# Patient Record
Sex: MALE | HISPANIC OR LATINO | Employment: FULL TIME | URBAN - METROPOLITAN AREA
[De-identification: names, ages, dates, MRNs, and addresses within clinical notes are randomized per-mention and may not be internally consistent; named-entity substitution may affect disease eponyms.]

---

## 2022-01-20 ENCOUNTER — OFFICE VISIT (OUTPATIENT)
Dept: FAMILY MEDICINE CLINIC | Facility: CLINIC | Age: 42
End: 2022-01-20

## 2022-01-20 VITALS
BODY MASS INDEX: 30.66 KG/M2 | DIASTOLIC BLOOD PRESSURE: 64 MMHG | TEMPERATURE: 97.5 F | HEIGHT: 71 IN | WEIGHT: 219 LBS | SYSTOLIC BLOOD PRESSURE: 112 MMHG | HEART RATE: 117 BPM | OXYGEN SATURATION: 97 %

## 2022-01-20 DIAGNOSIS — E66.9 OBESITY (BMI 30-39.9): ICD-10-CM

## 2022-01-20 DIAGNOSIS — Z30.09 VASECTOMY EVALUATION: ICD-10-CM

## 2022-01-20 DIAGNOSIS — Z76.89 ENCOUNTER TO ESTABLISH CARE: Primary | ICD-10-CM

## 2022-01-20 PROCEDURE — 99212 OFFICE O/P EST SF 10 MIN: CPT | Performed by: INTERNAL MEDICINE

## 2022-01-25 NOTE — PROGRESS NOTES
Assessment/Plan:    No problem-specific Assessment & Plan notes found for this encounter  Diagnoses and all orders for this visit:    Obesity (BMI 30-39 9)  -     Comprehensive metabolic panel; Future  -     TSH, 3rd generation with Free T4 reflex; Future  -     CBC and differential; Future  -     Lipid panel; Future  -     UA (URINE) with reflex to Scope    Vasectomy evaluation  -     Ambulatory Referral to Urology; Future          Subjective:      Patient ID: Lady Alan is a 46 y o  male  Patient came today to establish care  He does not have any medical insurance  Does not have any active complaints  He was also curious about vasectomy and he would like to see the specialist for that  He does not smoke, does not drink alcohol  The following portions of the patient's history were reviewed and updated as appropriate: allergies, current medications, past family history, past medical history, past social history, past surgical history, and problem list     Review of Systems   Constitutional: Negative for chills, fatigue and fever  Respiratory: Negative for cough, chest tightness and shortness of breath  Cardiovascular: Negative for chest pain, palpitations and leg swelling  Gastrointestinal: Negative for abdominal distention, abdominal pain, blood in stool, constipation, diarrhea and nausea  Genitourinary: Negative for difficulty urinating and dysuria  Musculoskeletal: Negative for arthralgias, back pain, gait problem, joint swelling, myalgias and neck pain  Skin: Negative for rash  Neurological: Negative for dizziness, weakness, numbness and headaches  Psychiatric/Behavioral: Negative for agitation           Objective:      /64 (BP Location: Left arm, Patient Position: Sitting, Cuff Size: Large)   Pulse (!) 117   Temp 97 5 °F (36 4 °C) (Temporal)   Ht 5' 11" (1 803 m)   Wt 99 3 kg (219 lb)   SpO2 97%   BMI 30 54 kg/m²     No Known Allergies     No current outpatient medications on file  Patient Instructions   For the blood work please go to [employer]  Physical Exam  Vitals reviewed  Constitutional:       General: He is not in acute distress  Appearance: He is not toxic-appearing  HENT:      Head: Normocephalic  Cardiovascular:      Rate and Rhythm: Normal rate  Pulses: Normal pulses  Pulmonary:      Effort: Pulmonary effort is normal    Skin:     General: Skin is warm  Neurological:      General: No focal deficit present  Mental Status: He is alert     Psychiatric:         Mood and Affect: Mood normal          Behavior: Behavior normal

## 2022-02-09 ENCOUNTER — TELEPHONE (OUTPATIENT)
Dept: UROLOGY | Facility: MEDICAL CENTER | Age: 42
End: 2022-02-09

## 2022-02-09 NOTE — TELEPHONE ENCOUNTER
Patient's wife inquiring for a np for vasectomy  Patient is self pay  She will speak to  to see if he wants to go ahead with it

## 2023-01-16 NOTE — PROGRESS NOTES
INTERNAL MEDICINE OFFICE VISIT NOTE  [facility] [place]    NAME: Ulices Bran  AGE: 38 y o  SEX: male    DATE OF ENCOUNTER: 1/17/2023       Chief Complaint   Patient presents with   • Establish Care     New Patient to [facility]      Previously following with Dr Niya Donovan  Last visit on 1/2022  No complaints  Never diagnosed with medical condition nor a psychiatric condition  Not on medication  Exercises on a regular basi s and makes an attempt to have a balanced diet  Has been considering vasectomy for more than a year now and is requesting a referral to a urologist    Significant FHx: NO Colon Ca, NO Breast Ca, h/o DM, no cardiac hx  Shx: Denies current Tobacco/vaping, infrequent hookah use, Denies marijuana, Denies illict drug use  Alcohol use: 2-3 drinks during the weekend  Environmental exposures: Not known   Works as a     for 16 years - heterosexual, 2 Kids - 3 y/o M, 1 6 y/o F,    NO Available labs or imaging reports to review  Review of Systems  10 point ROS negative except per HPI    OBJECTIVE:  Vitals:    01/17/23 1411   BP: 110/70   BP Location: Left arm   Patient Position: Sitting   Cuff Size: Standard   Pulse: 67   Temp: (!) 97 4 °F (36 3 °C)   SpO2: 100%   Weight: 90 3 kg (199 lb)   Height: 5' 11" (1 803 m)       Physical Exam:   GENERAL: NAD, Normal appearance  Non diaphoretic, non-toxic, not ill-appearing, well-developed, well-nourished  NEUROLOGIC:  Alert/oriented x3  HEENT:  NC/AT, EOMI, MMM, no scleral icterus  CARDIAC:  RRR, +S1/S2, no S3/S4 heard, no m/g/r  PULMONARY:  non-labored breathing, CTA B/L, no wheezing/rales/rhonci appreciated at time of encounter  ABDOMEN:  Soft, NT/ND, +BS, no rebound/guarding/rigidity  Extremities:  2+ Pulses in DP/PT  No edema, cyanosis  SKIN:  No rashes or erythema    ASSESSMENT/PLAN:    1  Encounter to establish care    2   Sterilization consult  Assessment & Plan:  Has been considering vasectomy for more than a year now and is requesting a referral to a urologist  - Referred to urology     Orders:  -     Ambulatory Referral to Urology; Future    3  Annual physical exam  -     CBC and Platelet; Future  -     Comprehensive metabolic panel; Future  -     Lipid Panel with Direct LDL reflex; Future  -     HEMOGLOBIN A1C W/ EAG ESTIMATION; Future  -     TSH, 3rd generation with Free T4 reflex; Future    4  Need for hepatitis C screening test  -     Hepatitis C Antibody ([facility]); Future    5  Screening for HIV (human immunodeficiency virus)  -     HIV 1/2 AG/AB w Reflex [facility] for 2 yr old and above; Future    6  Overweight (BMI 25 0-29  9)  Assessment & Plan: Body mass index is 27 75 kg/m²  Wt Readings from Last 3 Encounters:   01/17/23 90 3 kg (199 lb)   01/20/22 99 3 kg (219 lb)      BMI Counseling: Body mass index is 27 75 kg/m²  The BMI is above normal  Nutrition recommendations include reducing portion sizes, decreasing overall calorie intake, 3-5 servings of fruits/vegetables daily, reducing fast food intake, consuming healthier snacks and decreasing soda and/or juice intake  Exercise recommendations include moderate aerobic physical activity for 150 minutes/week  No current outpatient medications on file          Brooklynn Anderson MD  [facility] [place]

## 2023-01-17 ENCOUNTER — OFFICE VISIT (OUTPATIENT)
Dept: INTERNAL MEDICINE CLINIC | Facility: CLINIC | Age: 43
End: 2023-01-17

## 2023-01-17 VITALS
BODY MASS INDEX: 27.86 KG/M2 | DIASTOLIC BLOOD PRESSURE: 70 MMHG | HEART RATE: 67 BPM | WEIGHT: 199 LBS | SYSTOLIC BLOOD PRESSURE: 110 MMHG | OXYGEN SATURATION: 100 % | TEMPERATURE: 97.4 F | HEIGHT: 71 IN

## 2023-01-17 DIAGNOSIS — Z11.4 SCREENING FOR HIV (HUMAN IMMUNODEFICIENCY VIRUS): ICD-10-CM

## 2023-01-17 DIAGNOSIS — Z00.00 ANNUAL PHYSICAL EXAM: ICD-10-CM

## 2023-01-17 DIAGNOSIS — E66.3 OVERWEIGHT (BMI 25.0-29.9): ICD-10-CM

## 2023-01-17 DIAGNOSIS — Z30.09 STERILIZATION CONSULT: ICD-10-CM

## 2023-01-17 DIAGNOSIS — Z76.89 ENCOUNTER TO ESTABLISH CARE: Primary | ICD-10-CM

## 2023-01-17 DIAGNOSIS — Z11.59 NEED FOR HEPATITIS C SCREENING TEST: ICD-10-CM

## 2023-01-17 NOTE — ASSESSMENT & PLAN NOTE
Has been considering vasectomy for more than a year now and is requesting a referral to a urologist  - Referred to urology

## 2023-01-17 NOTE — ASSESSMENT & PLAN NOTE
Body mass index is 27 75 kg/m²  Wt Readings from Last 3 Encounters:   01/17/23 90 3 kg (199 lb)   01/20/22 99 3 kg (219 lb)      BMI Counseling: Body mass index is 27 75 kg/m²  The BMI is above normal  Nutrition recommendations include reducing portion sizes, decreasing overall calorie intake, 3-5 servings of fruits/vegetables daily, reducing fast food intake, consuming healthier snacks and decreasing soda and/or juice intake  Exercise recommendations include moderate aerobic physical activity for 150 minutes/week

## 2023-01-21 ENCOUNTER — APPOINTMENT (OUTPATIENT)
Dept: LAB | Age: 43
End: 2023-01-21

## 2023-01-21 DIAGNOSIS — Z00.00 ANNUAL PHYSICAL EXAM: ICD-10-CM

## 2023-01-21 DIAGNOSIS — Z11.4 SCREENING FOR HIV (HUMAN IMMUNODEFICIENCY VIRUS): ICD-10-CM

## 2023-01-21 DIAGNOSIS — Z11.59 NEED FOR HEPATITIS C SCREENING TEST: ICD-10-CM

## 2023-01-21 LAB
ALBUMIN SERPL BCP-MCNC: 3.9 G/DL (ref 3.5–5)
ALP SERPL-CCNC: 43 U/L (ref 46–116)
ALT SERPL W P-5'-P-CCNC: 38 U/L (ref 12–78)
ANION GAP SERPL CALCULATED.3IONS-SCNC: 2 MMOL/L (ref 4–13)
AST SERPL W P-5'-P-CCNC: 25 U/L (ref 5–45)
BILIRUB SERPL-MCNC: 0.55 MG/DL (ref 0.2–1)
BUN SERPL-MCNC: 22 MG/DL (ref 5–25)
CALCIUM SERPL-MCNC: 9.2 MG/DL (ref 8.3–10.1)
CHLORIDE SERPL-SCNC: 108 MMOL/L (ref 96–108)
CHOLEST SERPL-MCNC: 132 MG/DL
CO2 SERPL-SCNC: 29 MMOL/L (ref 21–32)
CREAT SERPL-MCNC: 1.24 MG/DL (ref 0.6–1.3)
ERYTHROCYTE [DISTWIDTH] IN BLOOD BY AUTOMATED COUNT: 12.1 % (ref 11.6–15.1)
EST. AVERAGE GLUCOSE BLD GHB EST-MCNC: 114 MG/DL
GFR SERPL CREATININE-BSD FRML MDRD: 72 ML/MIN/1.73SQ M
GLUCOSE P FAST SERPL-MCNC: 93 MG/DL (ref 65–99)
HBA1C MFR BLD: 5.6 %
HCT VFR BLD AUTO: 44.1 % (ref 36.5–49.3)
HCV AB SER QL: NORMAL
HDLC SERPL-MCNC: 48 MG/DL
HGB BLD-MCNC: 14.3 G/DL (ref 12–17)
LDLC SERPL CALC-MCNC: 75 MG/DL (ref 0–100)
MCH RBC QN AUTO: 29.9 PG (ref 26.8–34.3)
MCHC RBC AUTO-ENTMCNC: 32.4 G/DL (ref 31.4–37.4)
MCV RBC AUTO: 92 FL (ref 82–98)
PLATELET # BLD AUTO: 298 THOUSANDS/UL (ref 149–390)
PMV BLD AUTO: 10.5 FL (ref 8.9–12.7)
POTASSIUM SERPL-SCNC: 4.5 MMOL/L (ref 3.5–5.3)
PROT SERPL-MCNC: 7.7 G/DL (ref 6.4–8.4)
RBC # BLD AUTO: 4.79 MILLION/UL (ref 3.88–5.62)
SODIUM SERPL-SCNC: 139 MMOL/L (ref 135–147)
TRIGL SERPL-MCNC: 45 MG/DL
TSH SERPL DL<=0.05 MIU/L-ACNC: 1.18 UIU/ML (ref 0.45–4.5)
WBC # BLD AUTO: 4.01 THOUSAND/UL (ref 4.31–10.16)

## 2023-01-23 ENCOUNTER — TELEPHONE (OUTPATIENT)
Dept: INTERNAL MEDICINE CLINIC | Facility: CLINIC | Age: 43
End: 2023-01-23

## 2023-01-23 LAB
HIV 1+2 AB+HIV1 P24 AG SERPL QL IA: NORMAL
HIV 2 AB SERPL QL IA: NORMAL
HIV1 AB SERPL QL IA: NORMAL
HIV1 P24 AG SERPL QL IA: NORMAL

## 2023-01-23 NOTE — TELEPHONE ENCOUNTER
Called patient gave results and understood     ----- Message from Phoenix Redding MD sent at 1/22/2023 11:28 AM EST -----  Please call the patient regarding his most recent labs  No remarkable findings  We'll see him in 6 months as planned

## 2023-02-03 ENCOUNTER — TELEPHONE (OUTPATIENT)
Dept: UROLOGY | Facility: AMBULATORY SURGERY CENTER | Age: 43
End: 2023-02-03

## 2023-02-03 NOTE — TELEPHONE ENCOUNTER
Please Triage  New Patient    What is the reason for the patient’s appointment? Sterilization consult    What office location does the patient prefer? [place]     Imaging/Lab Results:    Do we accept the patient's insurance or is the patient Self-Pay? Yes     Insurance Provider: Al 4 Type/Number:  Member ID#: Has the patient had any previous Urologist(s)? No     Have patient records been requested? If not are records showing in [address] [place] [address] Rd: records in [facility]     Has the patient had any outside testing done? No     Does the patient have a personal history of cancer?  No

## 2023-03-06 ENCOUNTER — OFFICE VISIT (OUTPATIENT)
Dept: UROLOGY | Facility: AMBULATORY SURGERY CENTER | Age: 43
End: 2023-03-06

## 2023-03-06 VITALS
HEIGHT: 71 IN | HEART RATE: 55 BPM | DIASTOLIC BLOOD PRESSURE: 62 MMHG | SYSTOLIC BLOOD PRESSURE: 114 MMHG | OXYGEN SATURATION: 100 % | BODY MASS INDEX: 27.35 KG/M2 | WEIGHT: 195.4 LBS

## 2023-03-06 DIAGNOSIS — Z30.09 STERILIZATION CONSULT: ICD-10-CM

## 2023-03-06 DIAGNOSIS — Z98.52 STATUS POST VASECTOMY: Primary | ICD-10-CM

## 2023-03-06 RX ORDER — LORAZEPAM 1 MG/1
1 TABLET ORAL ONCE
Qty: 1 TABLET | Refills: 0 | Status: SHIPPED | OUTPATIENT
Start: 2023-03-06 | End: 2023-03-06

## 2023-03-06 NOTE — PROGRESS NOTES
3/6/2023      Chief Complaint   Patient presents with   • Sterilization     consult     Assessment and Plan    38 y o  male managed by new patient    1  Encounter for sterilization  · Prescription for lorazepam sent to pharmacy  · Procedure consent signed  · Semen analysis ordered to be performed at 3 months post procedure  · Follow-up in the office as scheduled for vasectomy procedure with physician    We discussed that vasectomy is in operation performed in the office in order to provide elective sterilization  This procedure should be considered a permanent option  Although there are subspecialists who perform vasectomy reversals, these operations are not 100% successful and are often not covered by insurance meaning they can come with a large out-of-pocket cost  The patient understands this  We reviewed the procedure in depth  Risk and benefits of the procedure were discussed and reviewed  Informed consent was obtained in the office today  The patient was prescribed a benzodiazepine to take one hour prior to the procedure to assist with his comfort  He understands that he will require transportation to and from the office that day if he is to use the benzodiazepine  He also understands he will require  semen analysis testing at 8 weeks post procedure to ensure full sterilization  In the interim, he will require contraception during intercourse to avoid an undesired pregnancy  Usually, patients are out of work for 2-3 days  We recommend tight fitting scrotal support following the procedure along with ice packs applied to the scrotum 15 minutes on and 15 minutes off for the first 24 hours  We discussed that we do send the patient home with short course of anti-inflammatory and/or narcotic pain medication  After this discussion, the patient agrees to proceed  We will schedule him in the near future  He agrees to oral sedative prior to procedure      History of Present Illness  [facility] 38 [facility] o  male here for discussion for vasectomy  Patient denies a history of genitourinary or groin trauma/surgery  He reports fathering 2 children ages 3 and 3-3/1years of age  He currently reports being in a relationship where both partners are desiring permanent sterility  They are currently using birth control pills as a form of contraception  Patient works as a tractor-  Currently denies all lower urinary tract symptoms including dysuria, hematuria, urinary frequency and urgency  Reports sensation of complete emptying with urination  He denies use of blood thinners and NSAIDs  Denies an allergy to Marcaine/lidocaine/Betadine/chromic  He denies previous history of erectile dysfunction  The patient presents requesting elective sterilization vasectomy  Review of Systems   Constitutional: Negative for chills and fever  Respiratory: Negative for cough and shortness of breath  Cardiovascular: Negative for chest pain  Gastrointestinal: Negative for abdominal distention, abdominal pain, blood in stool, nausea and vomiting  Genitourinary: Negative for difficulty urinating, dysuria, enuresis, flank pain, frequency, hematuria and urgency  Skin: Negative for rash  Past Medical History  History reviewed  No pertinent past medical history  Past Social History  History reviewed  No pertinent surgical history    Social History     Tobacco Use   Smoking Status Never   Smokeless Tobacco Never       Past Family History  Family History   Problem Relation Age of Onset   • Diabetes Mother        Past Social history  Social History     Socioeconomic History   • Marital status: /Civil Union     Spouse name: Not on file   • Number of children: Not on file   • Years of education: Not on file   • Highest education level: Not on file   Occupational History     Comment: [employer] as a    Tobacco Use   • Smoking status: Never   • Smokeless tobacco: Never   Vaping Use   • Vaping Use: Former   Substance and Sexual Activity   • Alcohol use: Yes     Comment: Weekend/social   • Drug use: Never   • Sexual activity: Yes     Partners: Female   Other Topics Concern   • Not on file   Social History Narrative    1/2023: Works as a       for 16 years - heterosexual, 2 Kids - 3 y/o M, 1 4 y/o F,     Social Determinants of Health     Financial Resource Strain: Not on [employer] Insecurity: Not on file   Transportation Needs: Not on file   Physical Activity: Not on file   Stress: Not on file   Social Connections: Not on file   Intimate Partner Violence: Not on file   Housing Stability: Not on file       Current Medications  Current Outpatient Medications   Medication Sig Dispense Refill   • LORazepam (ATIVAN) 1 mg tablet Take 1 tablet (1 mg total) by mouth 1 (one) time for 1 dose 1 tablet 0     No current facility-administered medications for this visit  Allergies  No Known Allergies      The following portions of the patient's history were reviewed and updated as appropriate: allergies, current medications, past medical history, past social history, past surgical history and problem list       Vitals  Vitals:    03/06/23 1440   BP: 114/62   BP Location: Right arm   Patient Position: Sitting   Cuff Size: Adult   Pulse: 55   SpO2: 100%   Weight: 88 6 kg (195 lb 6 4 oz)   Height: 5' 11" (1 803 m)           Physical Exam  Physical Exam  Vitals reviewed  Constitutional:       General: He is not in acute distress  Appearance: Normal appearance  He is normal weight  HENT:      Head: Normocephalic  Cardiovascular:      Rate and Rhythm: Normal rate  Pulmonary:      Effort: No respiratory distress  Breath sounds: Normal breath sounds  Abdominal:      Hernia: There is no hernia in the left inguinal area or right inguinal area  Genitourinary:     Penis: Normal and uncircumcised   No phimosis, paraphimosis, hypospadias, erythema, tenderness, discharge, swelling or lesions  Testes: Normal          Right: Mass, tenderness, swelling or testicular hydrocele not present  Left: Mass, tenderness, swelling or testicular hydrocele not present  Epididymis:      Right: Normal       Left: Normal    Lymphadenopathy:      Lower Body: No right inguinal adenopathy  No left inguinal adenopathy  Skin:     General: Skin is warm and dry  Neurological:      General: No focal deficit present  Mental Status: He is alert and oriented to person, place, and time  Psychiatric:         Mood and Affect: Mood normal          Behavior: Behavior normal        Results  No results found for this or any previous visit (from the past 1 hour(s))  ]  No results found for: PSA  Lab Results   Component Value Date    CALCIUM 9 2 01/21/2023    K 4 5 01/21/2023    CO2 29 01/21/2023     01/21/2023    BUN 22 01/21/2023    CREATININE 1 24 01/21/2023     Lab Results   Component Value Date    WBC 4 01 (L) 01/21/2023    HGB 14 3 01/21/2023    HCT 44 1 01/21/2023    MCV 92 01/21/2023     01/21/2023       Orders  Orders Placed This Encounter   Procedures   • Semen analysis, post-vasectomy     This is a patient instruction: Please call Central Scheduling [phone] to make an appointment for testing  Please refer to patient instructions on the Post Vasectomy Form provided to you by your doctor  If you have additional questions on collection of your specimen, please call the Lab Call Center at [phone]           Standing Status:   Future     Standing Expiration Date:   3/6/2024       ELISEO Magana

## 2023-05-19 ENCOUNTER — PROCEDURE VISIT (OUTPATIENT)
Dept: UROLOGY | Facility: AMBULATORY SURGERY CENTER | Age: 43
End: 2023-05-19

## 2023-05-19 VITALS
BODY MASS INDEX: 26.78 KG/M2 | SYSTOLIC BLOOD PRESSURE: 120 MMHG | DIASTOLIC BLOOD PRESSURE: 80 MMHG | HEART RATE: 92 BPM | OXYGEN SATURATION: 98 % | WEIGHT: 192 LBS

## 2023-05-19 DIAGNOSIS — Z98.52 STATUS POST VASECTOMY: ICD-10-CM

## 2023-05-19 DIAGNOSIS — Z30.09 STERILIZATION CONSULT: Primary | ICD-10-CM

## 2023-05-24 PROCEDURE — 88302 TISSUE EXAM BY PATHOLOGIST: CPT | Performed by: PATHOLOGY

## 2023-08-10 ENCOUNTER — APPOINTMENT (OUTPATIENT)
Dept: LAB | Facility: HOSPITAL | Age: 43
End: 2023-08-10
Attending: UROLOGY
Payer: COMMERCIAL

## 2023-08-10 DIAGNOSIS — Z98.52 STATUS POST VASECTOMY: ICD-10-CM

## 2023-08-10 LAB
DEPRECATED CD4 CELLS/CD8 CELLS BLD: 3 ML
SPERM MOTILE SMN QL MICRO: NORMAL

## 2023-08-10 PROCEDURE — 89321 SEMEN ANAL SPERM DETECTION: CPT

## 2023-08-11 ENCOUNTER — TELEPHONE (OUTPATIENT)
Dept: UROLOGY | Facility: AMBULATORY SURGERY CENTER | Age: 43
End: 2023-08-11

## 2023-08-11 NOTE — TELEPHONE ENCOUNTER
Contacted patient and made him aware of negative semen analysis results and that he is considered sterile.

## 2024-04-03 ENCOUNTER — OFFICE VISIT (OUTPATIENT)
Dept: INTERNAL MEDICINE CLINIC | Facility: CLINIC | Age: 44
End: 2024-04-03
Payer: COMMERCIAL

## 2024-04-03 VITALS
WEIGHT: 202 LBS | BODY MASS INDEX: 28.28 KG/M2 | DIASTOLIC BLOOD PRESSURE: 68 MMHG | RESPIRATION RATE: 18 BRPM | TEMPERATURE: 97.3 F | SYSTOLIC BLOOD PRESSURE: 120 MMHG | OXYGEN SATURATION: 98 % | HEIGHT: 71 IN | HEART RATE: 95 BPM

## 2024-04-03 DIAGNOSIS — Z00.00 ANNUAL PHYSICAL EXAM: Primary | ICD-10-CM

## 2024-04-03 DIAGNOSIS — M25.511 ACUTE PAIN OF RIGHT SHOULDER: ICD-10-CM

## 2024-04-03 DIAGNOSIS — R35.0 URINARY FREQUENCY: ICD-10-CM

## 2024-04-03 PROCEDURE — 99396 PREV VISIT EST AGE 40-64: CPT | Performed by: STUDENT IN AN ORGANIZED HEALTH CARE EDUCATION/TRAINING PROGRAM

## 2024-04-03 PROCEDURE — 99214 OFFICE O/P EST MOD 30 MIN: CPT | Performed by: STUDENT IN AN ORGANIZED HEALTH CARE EDUCATION/TRAINING PROGRAM

## 2024-04-03 RX ORDER — MELOXICAM 15 MG/1
TABLET ORAL
Qty: 30 TABLET | Refills: 0 | Status: SHIPPED | OUTPATIENT
Start: 2024-04-03

## 2024-04-03 RX ORDER — METHOCARBAMOL 750 MG/1
TABLET, FILM COATED ORAL
Qty: 30 TABLET | Refills: 0 | Status: SHIPPED | OUTPATIENT
Start: 2024-04-03

## 2024-04-03 NOTE — ASSESSMENT & PLAN NOTE
Complains of slowly progressing right shoulder pain particularly when lifting heavy objects or lifting his kids over is shoulders.  -Unable to recall an exacerbating event  -Mild crepitus on exam with normal range of motion.  -Will proceed with a 2-week trial of meloxicam and methocarbamol.  May extend up to 4 weeks  -Advised to contact our office if pain persist beyond 4 weeks

## 2024-04-03 NOTE — ASSESSMENT & PLAN NOTE
Complains of urinary frequency without urgency or dysuria  Reports stable stream. No agitation.  -Proceed with a urinalysis

## 2024-04-03 NOTE — PROGRESS NOTES
"INTERNAL MEDICINE OFFICE VISIT NOTE  [facility] [place]    NAME: Garcia Rivera  AGE: 43 y.o. SEX: male    DATE OF ENCOUNTER: 4/3/2024       Chief Complaint   Patient presents with    Follow-up     Pt refused tdap     Patient presents for a comprehensive physical exam      Diet/Nutrition: well balanced diet.   Exercise:  Exercises regularly however not in the last month .   Sleep: sleeps well.   Hearing: normal - bilateral.  Vision: no vision problems.   Dental: no dental visits for >1 year.        Health  Symptoms include: none    /GYN Health    Depression Screening  PHQ-2/9 Depression Screening    Little interest or pleasure in doing things: 0 - not at all  Feeling down, depressed, or hopeless: 0 - not at all  PHQ-2 Score: 0  PHQ-2 Interpretation: Negative depression screen         Labs reviewed.       Review of Systems  10 point ROS negative except per HPI    OBJECTIVE:  Vitals:    04/03/24 1132   BP: 120/68   BP Location: Left arm   Patient Position: Sitting   Cuff Size: Standard   Pulse: 95   Resp: 18   Temp: (!) 97.3 °F (36.3 °C)   SpO2: 98%   Weight: 91.6 kg (202 lb)   Height: 5' 11\" (1.803 m)       Physical Exam:   GENERAL: NAD, Normal appearance. Non diaphoretic, non-toxic, well-developed, well-nourished.   NEUROLOGIC:  Alert/oriented x3  HEENT:  NC/AT, PERRL, EOMI, no scleral icterus  CARDIAC:  RRR, +S1/S2, no m/g/r  PULMONARY:  non-labored breathing, CTA B/L  ABDOMEN:  Soft, NT/ND, +BS  Extremities:  No edema  SKIN:  No rashes or erythema     ASSESSMENT/PLAN:    1. Annual physical exam  -     CBC; Future  -     Comprehensive metabolic panel; Future  -     TSH, 3rd generation with Free T4 reflex; Future  -     Hemoglobin A1C; Future  -     Lipid Panel with Direct LDL reflex; Future    2. Acute pain of right shoulder  Assessment & Plan:  Complains of slowly progressing right shoulder pain particularly when lifting heavy objects or lifting his kids over is shoulders.  -Unable to " recall an exacerbating event  -Mild crepitus on exam with normal range of motion.  -Will proceed with a 2-week trial of meloxicam and methocarbamol.  May extend up to 4 weeks  -Advised to contact our office if pain persist beyond 4 weeks    Orders:  -     meloxicam (MOBIC) 15 mg tablet; Take 1 tablet in the morning after breakfast - DO NOT REFILL  -     methocarbamol (Robaxin-750) 750 mg tablet; Take 1 tablet 1 hour before bedtime - allow 7-8 hrs of sleep that night.    3. Urinary frequency  Assessment & Plan:  Complains of urinary frequency without urgency or dysuria  Reports stable stream. No agitation.  -Proceed with a urinalysis    Orders:  -     UA w Reflex to Microscopic w Reflex to Culture; Future        Return in 6 months (on 10/3/2024).      Current Outpatient Medications:     meloxicam (MOBIC) 15 mg tablet, Take 1 tablet in the morning after breakfast - DO NOT REFILL, Disp: 30 tablet, Rfl: 0    methocarbamol (Robaxin-750) 750 mg tablet, Take 1 tablet 1 hour before bedtime - allow 7-8 hrs of sleep that night., Disp: 30 tablet, Rfl: 0    LORazepam (ATIVAN) 1 mg tablet, Take 1 tablet (1 mg total) by mouth 1 (one) time for 1 dose (Patient not taking: Reported on 4/3/2024), Disp: 1 tablet, Rfl: 0    TCM Call       None          TCM Call       None               Matt Wing MD  [facility] [place]    * Please Note: This note was completed in part utilizing a dictation software.  Grammatical errors, random word insertions, spelling mistakes, and incomplete sentences may be an occasional consequence of this system secondary to software limitations, ambient noise, and hardware issues.  If you have any questions or concerns about the content, text, or information contained within the body of this dictation, please contact the provider for clarification.**

## 2024-04-13 ENCOUNTER — APPOINTMENT (OUTPATIENT)
Dept: LAB | Age: 44
End: 2024-04-13
Payer: COMMERCIAL

## 2024-04-13 DIAGNOSIS — R35.0 URINARY FREQUENCY: ICD-10-CM

## 2024-04-13 DIAGNOSIS — Z00.00 ANNUAL PHYSICAL EXAM: ICD-10-CM

## 2024-04-13 LAB
BACTERIA UR QL AUTO: NORMAL /HPF
BILIRUB UR QL STRIP: NEGATIVE
CLARITY UR: ABNORMAL
COLOR UR: ABNORMAL
ERYTHROCYTE [DISTWIDTH] IN BLOOD BY AUTOMATED COUNT: 12 % (ref 11.6–15.1)
GLUCOSE UR STRIP-MCNC: NEGATIVE MG/DL
HCT VFR BLD AUTO: 44.5 % (ref 36.5–49.3)
HGB BLD-MCNC: 14.3 G/DL (ref 12–17)
HGB UR QL STRIP.AUTO: NEGATIVE
KETONES UR STRIP-MCNC: NEGATIVE MG/DL
LEUKOCYTE ESTERASE UR QL STRIP: NEGATIVE
MCH RBC QN AUTO: 29.5 PG (ref 26.8–34.3)
MCHC RBC AUTO-ENTMCNC: 32.1 G/DL (ref 31.4–37.4)
MCV RBC AUTO: 92 FL (ref 82–98)
NITRITE UR QL STRIP: NEGATIVE
NON-SQ EPI CELLS URNS QL MICRO: NORMAL /HPF
PH UR STRIP.AUTO: 6 [PH]
PLATELET # BLD AUTO: 286 THOUSANDS/UL (ref 149–390)
PMV BLD AUTO: 10.3 FL (ref 8.9–12.7)
PROT UR STRIP-MCNC: ABNORMAL MG/DL
RBC # BLD AUTO: 4.85 MILLION/UL (ref 3.88–5.62)
RBC #/AREA URNS AUTO: NORMAL /HPF
SP GR UR STRIP.AUTO: 1.03 (ref 1–1.03)
TSH SERPL DL<=0.05 MIU/L-ACNC: 1.55 UIU/ML (ref 0.45–4.5)
UROBILINOGEN UR STRIP-ACNC: <2 MG/DL
WBC # BLD AUTO: 3.76 THOUSAND/UL (ref 4.31–10.16)
WBC #/AREA URNS AUTO: NORMAL /HPF

## 2024-04-13 PROCEDURE — 36415 COLL VENOUS BLD VENIPUNCTURE: CPT

## 2024-04-13 PROCEDURE — 85027 COMPLETE CBC AUTOMATED: CPT

## 2024-04-13 PROCEDURE — 81001 URINALYSIS AUTO W/SCOPE: CPT

## 2024-04-13 PROCEDURE — 80053 COMPREHEN METABOLIC PANEL: CPT

## 2024-04-13 PROCEDURE — 84443 ASSAY THYROID STIM HORMONE: CPT

## 2024-04-13 PROCEDURE — 83036 HEMOGLOBIN GLYCOSYLATED A1C: CPT

## 2024-04-13 PROCEDURE — 80061 LIPID PANEL: CPT

## 2024-04-14 LAB
ALBUMIN SERPL BCP-MCNC: 4.1 G/DL (ref 3.5–5)
ALP SERPL-CCNC: 35 U/L (ref 34–104)
ALT SERPL W P-5'-P-CCNC: 17 U/L (ref 7–52)
ANION GAP SERPL CALCULATED.3IONS-SCNC: 9 MMOL/L (ref 4–13)
AST SERPL W P-5'-P-CCNC: 17 U/L (ref 13–39)
BILIRUB SERPL-MCNC: 0.58 MG/DL (ref 0.2–1)
BUN SERPL-MCNC: 19 MG/DL (ref 5–25)
CALCIUM SERPL-MCNC: 9 MG/DL (ref 8.4–10.2)
CHLORIDE SERPL-SCNC: 102 MMOL/L (ref 96–108)
CHOLEST SERPL-MCNC: 152 MG/DL
CO2 SERPL-SCNC: 27 MMOL/L (ref 21–32)
CREAT SERPL-MCNC: 0.93 MG/DL (ref 0.6–1.3)
EST. AVERAGE GLUCOSE BLD GHB EST-MCNC: 123 MG/DL
GFR SERPL CREATININE-BSD FRML MDRD: 101 ML/MIN/1.73SQ M
GLUCOSE P FAST SERPL-MCNC: 81 MG/DL (ref 65–99)
HBA1C MFR BLD: 5.9 %
HDLC SERPL-MCNC: 48 MG/DL
LDLC SERPL CALC-MCNC: 90 MG/DL (ref 0–100)
POTASSIUM SERPL-SCNC: 4.6 MMOL/L (ref 3.5–5.3)
PROT SERPL-MCNC: 6.8 G/DL (ref 6.4–8.4)
SODIUM SERPL-SCNC: 138 MMOL/L (ref 135–147)
TRIGL SERPL-MCNC: 72 MG/DL

## 2024-04-15 DIAGNOSIS — R80.9 PROTEINURIA, UNSPECIFIED TYPE: Primary | ICD-10-CM

## 2024-04-24 ENCOUNTER — APPOINTMENT (OUTPATIENT)
Dept: LAB | Age: 44
End: 2024-04-24
Payer: COMMERCIAL

## 2024-04-24 DIAGNOSIS — R80.9 PROTEINURIA, UNSPECIFIED TYPE: ICD-10-CM

## 2024-04-24 LAB
CREAT UR-MCNC: 60.3 MG/DL
PROT UR-MCNC: <4 MG/DL

## 2024-04-24 PROCEDURE — 82570 ASSAY OF URINE CREATININE: CPT

## 2024-04-24 PROCEDURE — 84156 ASSAY OF PROTEIN URINE: CPT

## 2024-09-03 ENCOUNTER — OFFICE VISIT (OUTPATIENT)
Dept: INTERNAL MEDICINE CLINIC | Facility: CLINIC | Age: 44
End: 2024-09-03

## 2024-09-03 VITALS
WEIGHT: 192 LBS | HEART RATE: 88 BPM | BODY MASS INDEX: 26.88 KG/M2 | DIASTOLIC BLOOD PRESSURE: 74 MMHG | TEMPERATURE: 98 F | SYSTOLIC BLOOD PRESSURE: 125 MMHG | HEIGHT: 71 IN

## 2024-09-03 DIAGNOSIS — G56.03 BILATERAL CARPAL TUNNEL SYNDROME: Primary | ICD-10-CM

## 2024-09-03 PROCEDURE — 99213 OFFICE O/P EST LOW 20 MIN: CPT | Performed by: STUDENT IN AN ORGANIZED HEALTH CARE EDUCATION/TRAINING PROGRAM

## 2024-09-03 RX ORDER — MELOXICAM 15 MG/1
TABLET ORAL
Qty: 30 TABLET | Refills: 0 | Status: SHIPPED | OUTPATIENT
Start: 2024-09-03 | End: 2024-09-03

## 2024-09-03 RX ORDER — MELOXICAM 15 MG/1
TABLET ORAL
Qty: 30 TABLET | Refills: 0 | Status: SHIPPED | OUTPATIENT
Start: 2024-09-03

## 2024-09-03 NOTE — PROGRESS NOTES
"INTERNAL MEDICINE OFFICE VISIT NOTE  [facility] [place]    NAME: Garcia Rivera  AGE: 44 y.o. SEX: male    DATE OF ENCOUNTER:       Chief Complaint   Patient presents with    Follow-up     Left Hand Pain/Numbness/Tingling           Review of Systems  10 point ROS negative except per HPI    OBJECTIVE:  Vitals:    09/03/24 1114   BP: 125/74   BP Location: Left arm   Patient Position: Standing   Cuff Size: Standard   Pulse: 88   Temp: 98 °F (36.7 °C)   Weight: 87.1 kg (192 lb)   Height: 5' 11\" (1.803 m)       Physical Exam:   GENERAL: NAD, Normal appearance.    NEUROLOGIC:  Alert/oriented x3.  HEENT:  NC/AT, no scleral icterus  CARDIAC:  RRR, +S1/S2  PULMONARY:  non-labored breathing  MSK as below    ASSESSMENT/PLAN:    1. Bilateral carpal tunnel syndrome  Assessment & Plan:  Presents with complaint of bilateral hand pain, left more than right.  Reports he has been progressively getting worse in the last 2 to 3 months.  Described as numbing tingling sensation and second third and fourth digit.  Worse at nighttime or whenever he is driving, he is a .  Positive Tejal and finals test on the left.  Negative on the right.  Advised OTC wrist support bilaterally to be worn at bedtime, however he may also use it during the day  Start meloxicam for 30 days and OTC Voltaren gel  Follow-up as needed  Orders:  -     meloxicam (MOBIC) 15 mg tablet; Take 1 tablet in the morning after breakfast - DO NOT REFILL      No follow-ups on file.      Current Outpatient Medications:     LORazepam (ATIVAN) 1 mg tablet, Take 1 tablet (1 mg total) by mouth 1 (one) time for 1 dose (Patient not taking: Reported on 9/3/2024), Disp: 1 tablet, Rfl: 0    meloxicam (MOBIC) 15 mg tablet, Take 1 tablet in the morning after breakfast - DO NOT REFILL (Patient not taking: Reported on 9/3/2024), Disp: 30 tablet, Rfl: 0    methocarbamol (Robaxin-750) 750 mg tablet, Take 1 tablet 1 hour before bedtime - allow 7-8 hrs of " sleep that night. (Patient not taking: Reported on 9/3/2024), Disp: 30 tablet, Rfl: 0    Patient Active Problem List   Diagnosis    Sterilization consult    Overweight (BMI 25.0-29.9)    Acute pain of right shoulder    Urinary frequency           Matt Wing MD  [facility] [place]    * Please Note: This note was completed in part utilizing a dictation software.  Grammatical errors, random word insertions, spelling mistakes, and incomplete sentences may be an occasional consequence of this system secondary to software limitations, ambient noise, and hardware issues.  If you have any questions or concerns about the content, text, or information contained within the body of this dictation, please contact the provider for clarification.**

## 2024-09-03 NOTE — ASSESSMENT & PLAN NOTE
Presents with complaint of bilateral hand pain, left more than right.  Reports he has been progressively getting worse in the last 2 to 3 months.  Described as numbing tingling sensation and second third and fourth digit.  Worse at nighttime or whenever he is driving, he is a .  Positive Tejal and finals test on the left.  Negative on the right.  Advised OTC wrist support bilaterally to be worn at bedtime, however he may also use it during the day  Start meloxicam for 30 days and OTC Voltaren gel  Follow-up as needed

## 2024-10-07 ENCOUNTER — TELEPHONE (OUTPATIENT)
Age: 44
End: 2024-10-07

## 2024-10-07 DIAGNOSIS — G56.03 BILATERAL CARPAL TUNNEL SYNDROME: Primary | ICD-10-CM

## 2024-10-07 NOTE — TELEPHONE ENCOUNTER
Patient called the office requesting for a referral to see orthopedic due to his carpal tunnel. Please review and advise, pt would like a call back once this is placed.

## 2024-10-14 ENCOUNTER — OFFICE VISIT (OUTPATIENT)
Dept: OBGYN CLINIC | Facility: CLINIC | Age: 44
End: 2024-10-14
Payer: COMMERCIAL

## 2024-10-14 VITALS
DIASTOLIC BLOOD PRESSURE: 70 MMHG | SYSTOLIC BLOOD PRESSURE: 114 MMHG | HEIGHT: 71 IN | WEIGHT: 192 LBS | BODY MASS INDEX: 26.88 KG/M2

## 2024-10-14 DIAGNOSIS — R20.0 BILATERAL HAND NUMBNESS: ICD-10-CM

## 2024-10-14 DIAGNOSIS — M79.642 BILATERAL HAND PAIN: Primary | ICD-10-CM

## 2024-10-14 DIAGNOSIS — M79.641 BILATERAL HAND PAIN: Primary | ICD-10-CM

## 2024-10-14 PROCEDURE — 99203 OFFICE O/P NEW LOW 30 MIN: CPT | Performed by: SURGERY

## 2024-10-14 RX ORDER — IBUPROFEN 400 MG/1
TABLET, FILM COATED ORAL EVERY 6 HOURS PRN
COMMUNITY

## 2024-10-14 NOTE — PROGRESS NOTES
ORTHOPAEDIC HAND, WRIST, AND ELBOW OFFICE  VISIT       ASSESSMENT/PLAN:      44 y.o. year old male who presents with Bilateral hand numbness    Physical exam was performed and we discussed the findings  Recommend restart night time only splinting  MSK US ordered for bilateral Carpal and Cubital tunnels      The patient verbalized understanding of exam findings and treatment plan. We engaged in the shared decision-making process and treatment options were discussed at length with the patient. Surgical and conservative management discussed today along with risks and benefits.    Diagnoses and all orders for this visit:    Bilateral hand pain  -     US MSK limited; Future  -     US MSK limited; Future    Bilateral hand numbness  -     Ambulatory Referral to Orthopedic Surgery    Other orders  -     ibuprofen (MOTRIN) 400 mg tablet; Take by mouth every 6 (six) hours as needed for mild pain        Follow Up:  Return for After Ultrasound.    To Do Next Visit:  Re-evaluation of current issue        ____________________________________________________________________________________________________________________________________________      CHIEF COMPLAINT:  Chief Complaint   Patient presents with    Left Wrist - Pain, Numbness    Right Wrist - Pain, Numbness       SUBJECTIVE:  Garcia Rivera is a 44 y.o. year old  male who presents for evaluation     Patient states he has had bilateral hand numbness and tingling that started about  two months ago. He went and was seen by his PCP who ordered bracing. PCP note reviewed  Patient states he only braced on the weekends and gave up at night due to discomfort and at times increased hand pain.   Left hand is worse in digits 1-3 with some on symptoms in the ring. Numbness is constant  No other treatments    I have personally reviewed all the relevant PMH, PSH, SH, FH, Medications and allergies      PAST MEDICAL HISTORY:  No past medical history on file.    PAST SURGICAL  HISTORY:  Past Surgical History:   Procedure Laterality Date    VASECTOMY  05/19/2023    Dr. Filippo eMeks       FAMILY HISTORY:  Family History   Problem Relation Age of Onset    Diabetes Mother        SOCIAL HISTORY:  Social History     Tobacco Use    Smoking status: Never    Smokeless tobacco: Never   Vaping Use    Vaping status: Former   Substance Use Topics    Alcohol use: Yes     Comment: Weekend/social    Drug use: Never       MEDICATIONS:    Current Outpatient Medications:     ibuprofen (MOTRIN) 400 mg tablet, Take by mouth every 6 (six) hours as needed for mild pain, Disp: , Rfl:     LORazepam (ATIVAN) 1 mg tablet, Take 1 tablet (1 mg total) by mouth 1 (one) time for 1 dose (Patient not taking: Reported on 9/3/2024), Disp: 1 tablet, Rfl: 0    meloxicam (MOBIC) 15 mg tablet, Take 1 tablet in the morning after breakfast - DO NOT REFILL (Patient not taking: Reported on 10/14/2024), Disp: 30 tablet, Rfl: 0    methocarbamol (Robaxin-750) 750 mg tablet, Take 1 tablet 1 hour before bedtime - allow 7-8 hrs of sleep that night. (Patient not taking: Reported on 9/3/2024), Disp: 30 tablet, Rfl: 0    ALLERGIES:  No Known Allergies        REVIEW OF SYSTEMS:  Review of Systems   Constitutional:  Negative for chills and fever.   HENT:  Negative for ear pain and sore throat.    Eyes:  Negative for pain and visual disturbance.   Respiratory:  Negative for cough and shortness of breath.    Cardiovascular:  Negative for chest pain and palpitations.   Gastrointestinal:  Negative for abdominal pain and vomiting.   Genitourinary:  Negative for dysuria and hematuria.   Musculoskeletal:  Negative for arthralgias and back pain.   Skin:  Negative for color change and rash.   Neurological:  Positive for numbness. Negative for seizures and syncope.   All other systems reviewed and are negative.      VITALS:  Vitals:    10/14/24 1709   BP: 114/70       LABS:  HgA1c:   Lab Results   Component Value Date    HGBA1C 5.9 (H) 04/13/2024      BMP:   Lab Results   Component Value Date    CALCIUM 9.0 04/13/2024    K 4.6 04/13/2024    CO2 27 04/13/2024     04/13/2024    BUN 19 04/13/2024    CREATININE 0.93 04/13/2024       _____________________________________________________  PHYSICAL EXAMINATION:  General: well developed and well nourished, alert, oriented times 3, and appears comfortable  Psychiatric: Normal  HEENT: Normocephalic, Atraumatic Trachea Midline, No torticollis  Pulmonary: No audible wheezing or respiratory distress   Abdomen/GI: Non tender, non distended   Cardiovascular: No pitting edema, 2+ radial pulse   Skin: No masses, erythema, lacerations, fluctation, ulcerations  Neurovascular: Sensation Intact to the Median, Ulnar, Radial Nerve, Motor Intact to the Median, Ulnar, Radial Nerve, and Pulses Intact  Musculoskeletal: Normal, except as noted in detailed exam and in HPI.      MUSCULOSKELETAL EXAMINATION:  Right hand:  SILT  Composite fist      Negative Durkan's  Negative elbow flexion compression test    2 point discrimination:   Thumb 5 mm  Index 5 mm  Middle 5 mm  Ring 5 mm  Small 5 mm    Left hand:  SILT  Composite fist    Negative Durkan's  Negative elbow flexion compression test    2 point discrimination:  5mm  Thumb 5 mm  Index 5 mm  Middle 5 mm  Ring 5 mm  Small 5 mm    ___________________________________________________  STUDIES REVIEWED:  No images reviewed    PROCEDURES PERFORMED:  Procedures  No Procedures performed today    _____________________________________________________      Scribe Attestation      I,:  Gabino Wilson am acting as a scribe while in the presence of the attending physician.:       I,:  Jeffrey Costello MD personally performed the services described in this documentation    as scribed in my presence.:

## 2024-10-29 ENCOUNTER — HOSPITAL ENCOUNTER (OUTPATIENT)
Dept: ULTRASOUND IMAGING | Facility: HOSPITAL | Age: 44
Discharge: HOME | End: 2024-10-29
Attending: SURGERY
Payer: COMMERCIAL

## 2024-10-29 DIAGNOSIS — M79.642 BILATERAL HAND PAIN: ICD-10-CM

## 2024-10-29 DIAGNOSIS — M79.641 BILATERAL HAND PAIN: ICD-10-CM

## 2024-10-29 PROCEDURE — 76882 US LMTD JT/FCL EVL NVASC XTR: CPT

## 2024-11-19 ENCOUNTER — OFFICE VISIT (OUTPATIENT)
Dept: OBGYN CLINIC | Facility: CLINIC | Age: 44
End: 2024-11-19
Payer: COMMERCIAL

## 2024-11-19 VITALS
WEIGHT: 192 LBS | BODY MASS INDEX: 26.88 KG/M2 | HEIGHT: 71 IN | DIASTOLIC BLOOD PRESSURE: 60 MMHG | SYSTOLIC BLOOD PRESSURE: 120 MMHG

## 2024-11-19 DIAGNOSIS — G56.03 CARPAL TUNNEL SYNDROME, BILATERAL: Primary | ICD-10-CM

## 2024-11-19 PROCEDURE — 99214 OFFICE O/P EST MOD 30 MIN: CPT | Performed by: SURGERY

## 2024-11-19 NOTE — LETTER
November 19, 2024     Patient: Garcia Rivera  YOB: 1980  Date of Visit: 11/19/2024      To Whom it May Concern:    Garcia Rivera is under my professional care. Garcia was seen in my office on 11/19/2024. Garcia will be having surgery on 12/13/24.  He will be held out of work until is follow up 2 weeks after surgery.    If you have any questions or concerns, please don't hesitate to call.         Sincerely,          Jeffrey Costello MD        CC: No Recipients

## 2024-11-19 NOTE — PROGRESS NOTES
ORTHOPAEDIC HAND, WRIST, AND ELBOW OFFICE  VISIT       ASSESSMENT/PLAN:      44 y.o. year old male who presents with Bilateral carpal tunnel syndrome    We discussed his US results show only carpal tunnel syndrome  He is not tolerating splinting so recommend surgery.   All of the risks and benefits of operative treatment were explained to the patient, as well as the risks and benefits of any alternative treatment options, including nonoperative care. This risks of surgery include, but are not limited to, infection, bleeding, blood clot, damage to nerves/arteries, need for further surgery, cardiovascular risk, anesthesia risk, and continued pain.  The patient understood this and elects to proceed forward with surgical intervention.  Left carpal Tunnel release  Local  Consent obtained      The patient verbalized understanding of exam findings and treatment plan. We engaged in the shared decision-making process and treatment options were discussed at length with the patient. Surgical and conservative management discussed today along with risks and benefits.    Diagnoses and all orders for this visit:    Carpal tunnel syndrome, bilateral  -     Case request operating room: RELEASE CARPAL TUNNEL, BILATERAL; Standing  -     Case request operating room: RELEASE CARPAL TUNNEL, BILATERAL          Follow Up:  Return for Post-Op, sutures removed.    To Do Next Visit:  Re-evaluation of current issue        ____________________________________________________________________________________________________________________________________________      CHIEF COMPLAINT:  Chief Complaint   Patient presents with    Left Wrist - Numbness, Pain    Right Wrist - Numbness, Pain       SUBJECTIVE:  Garcia Rivera is a 44 y.o. year old  male who presents for follow up of bilateral hand      Patient states that his hands are a little worse since last visit. He states he has not really been splinting at night as he cannot sleep with them  on. 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    PAST SURGICAL HISTORY:  Past Surgical History:   Procedure Laterality Date    VASECTOMY  05/19/2023    Dr. Filippo Meeks       FAMILY HISTORY:  Family History   Problem Relation Age of Onset    Diabetes Mother        SOCIAL HISTORY:  Social History     Tobacco Use    Smoking status: Never    Smokeless tobacco: Never   Vaping Use    Vaping status: Former   Substance Use Topics    Alcohol use: Yes     Comment: Weekend/social    Drug use: Never       MEDICATIONS:    Current Outpatient Medications:     ibuprofen (MOTRIN) 400 mg tablet, Take by mouth every 6 (six) hours as needed for mild pain, Disp: , Rfl:     LORazepam (ATIVAN) 1 mg tablet, Take 1 tablet (1 mg total) by mouth 1 (one) time for 1 dose (Patient not taking: Reported on 9/3/2024), Disp: 1 tablet, Rfl: 0    meloxicam (MOBIC) 15 mg tablet, Take 1 tablet in the morning after breakfast - DO NOT REFILL (Patient not taking: Reported on 11/19/2024), Disp: 30 tablet, Rfl: 0    methocarbamol (Robaxin-750) 750 mg tablet, Take 1 tablet 1 hour before bedtime - allow 7-8 hrs of sleep that night. (Patient not taking: Reported on 11/19/2024), Disp: 30 tablet, Rfl: 0    ALLERGIES:  No Known Allergies        REVIEW OF SYSTEMS:  Review of Systems   Constitutional:  Negative for chills and fever.   HENT:  Negative for ear pain and sore throat.    Eyes:  Negative for pain and visual disturbance.   Respiratory:  Negative for cough and shortness of breath.    Cardiovascular:  Negative for chest pain and palpitations.   Gastrointestinal:  Negative for abdominal pain and vomiting.   Genitourinary:  Negative for dysuria and hematuria.   Musculoskeletal:  Negative for arthralgias and back pain.   Skin:  Negative for color change and rash.   Neurological:   Positive for numbness. Negative for seizures and syncope.   All other systems reviewed and are negative.      VITALS:  Vitals:    11/19/24 1358   BP: 120/60       LABS:  HgA1c:   Lab Results   Component Value Date    HGBA1C 5.9 (H) 04/13/2024     BMP:   Lab Results   Component Value Date    CALCIUM 9.0 04/13/2024    K 4.6 04/13/2024    CO2 27 04/13/2024     04/13/2024    BUN 19 04/13/2024    CREATININE 0.93 04/13/2024       _____________________________________________________  PHYSICAL EXAMINATION:  General: well developed and well nourished, alert, oriented times 3, and appears comfortable  Psychiatric: Normal  HEENT: Normocephalic, Atraumatic Trachea Midline, No torticollis  Pulmonary: No audible wheezing or respiratory distress   Abdomen/GI: Non tender, non distended   Cardiovascular: No pitting edema, 2+ radial pulse   Skin: No masses, erythema, lacerations, fluctation, ulcerations  Neurovascular: Sensation Intact to the Median, Ulnar, Radial Nerve, Motor Intact to the Median, Ulnar, Radial Nerve, and Pulses Intact  Musculoskeletal: Normal, except as noted in detailed exam and in HPI.      MUSCULOSKELETAL EXAMINATION:  Right hand:  SILT  Composite fist      Negative Durkan's  Negative elbow flexion compression test    2 point discrimination:   Thumb 5 mm  Index 5 mm  Middle 5 mm  Ring 5 mm  Small 5 mm    Left hand:  SILT  Composite fist    Negative Durkan's  Negative elbow flexion compression test    2 point discrimination:  5mm  Thumb 5 mm  Index 5 mm  Middle 5 mm  Ring 5 mm  Small 5 mm    ___________________________________________________  STUDIES REVIEWED:  MSK US Bilateral Wrists 10/29/2024  FINDINGS:     RIGHT SIDE:  Median nerve cross sectional area distal forearm (CSAp): 6.1 sq mm.     Maximum median nerve cross sectional area within carpal tunnel (CSAc): 21.6 sq mm.     Delta CSA (CSAc-CSAp): 15.5 sq mm.     Wrist to Forearm ratio (WFR ratio) (CSAc/CSAp): 3.5.     Vascularity: No  hypervascularity.           LEFT SIDE:  Median nerve cross sectional area distal forearm (CSAp): 7.4 sq mm.     Maximum median nerve cross sectional area within carpal tunnel (CSAc): 15.8 sq mm.     Delta CSA (CSAc-CSAp): 8.4 sq mm.     Wrist to Forearm ratio (WFR ratio) (CSAc/CSAp): 2.1.     Vascularity: No hypervascularity.           IMPRESSION:     RIGHT SIDE: Carpal tunnel syndrome ruled in based on marked abnormal CSA >/= 14 sq mm.     LEFT SIDE: Carpal tunnel syndrome ruled in based on marked abnormal CSA >/= 14 sq mm.    MSK US Bilateral elbows 10/29/2024  FINDINGS:     RIGHT SIDE:  Ulnar nerve cross sectional area proximally: 4.2 sq mm.     Maximum ulnar nerve cross sectional area within or near the cubital tunnel: 6.2 sq mm.     Ulnar nerve cross sectional area distal to cubital tunnel: 5.5 sq mm.     Maximal ulnar nerve/proximal cross sectional ratio: 1.5.        No evidence of ulnar nerve dislocation from the cubital tunnel on dynamic flexion-extension evaluation.     No accessory anconeus epitrochlearis muscle.        LEFT SIDE:     Ulnar nerve cross sectional area proximally: 4.3 sq mm.     Maximum ulnar nerve cross sectional area within or near the cubital tunnel: 5.9 sq mm.     Ulnar nerve cross sectional area distal to cubital tunnel: 4.8 sq mm.     Maximal ulnar nerve/proximal cross sectional ratio: 1.4.        No evidence of ulnar nerve dislocation from the cubital tunnel on dynamic flexion-extension evaluation.     No accessory anconeus epitrochlearis muscle.        IMPRESSION:     RIGHT CUBITAL TUNNEL: No sonographic findings to suggest ulnar neuropathy/cubital tunnel syndrome.     LEFT CUBITAL TUNNEL: No sonographic findings to suggest ulnar neuropathy/cubital tunnel syndrome.      PROCEDURES PERFORMED:  Procedures  No Procedures performed today    _____________________________________________________      Scribe Attestation      I,:  Gabino Wilson am acting as a scribe while in the presence of  the attending physician.:       I,:  Jeffrey Costello MD personally performed the services described in this documentation    as scribed in my presence.:

## 2024-12-13 ENCOUNTER — HOSPITAL ENCOUNTER (OUTPATIENT)
Age: 44
Setting detail: OUTPATIENT SURGERY
Discharge: HOME | End: 2024-12-13
Attending: SURGERY | Admitting: SURGERY
Payer: COMMERCIAL

## 2024-12-13 VITALS
HEART RATE: 62 BPM | HEIGHT: 71 IN | BODY MASS INDEX: 25.48 KG/M2 | DIASTOLIC BLOOD PRESSURE: 63 MMHG | WEIGHT: 182 LBS | SYSTOLIC BLOOD PRESSURE: 129 MMHG | RESPIRATION RATE: 18 BRPM | OXYGEN SATURATION: 96 % | TEMPERATURE: 98.2 F

## 2024-12-13 DIAGNOSIS — G56.03 BILATERAL CARPAL TUNNEL SYNDROME: Primary | ICD-10-CM

## 2024-12-13 PROCEDURE — 64721 CARPAL TUNNEL SURGERY: CPT | Performed by: PHYSICIAN ASSISTANT

## 2024-12-13 PROCEDURE — 64721 CARPAL TUNNEL SURGERY: CPT | Performed by: SURGERY

## 2024-12-13 RX ORDER — MAGNESIUM HYDROXIDE 1200 MG/15ML
LIQUID ORAL AS NEEDED
Status: DISCONTINUED | OUTPATIENT
Start: 2024-12-13 | End: 2024-12-13 | Stop reason: HOSPADM

## 2024-12-13 RX ORDER — NAPROXEN 500 MG/1
500 TABLET ORAL 2 TIMES DAILY WITH MEALS
Qty: 10 TABLET | Refills: 0 | Status: SHIPPED | OUTPATIENT
Start: 2024-12-13 | End: 2024-12-16

## 2024-12-13 RX ORDER — BUPIVACAINE HYDROCHLORIDE AND EPINEPHRINE 2.5; 5 MG/ML; UG/ML
INJECTION, SOLUTION EPIDURAL; INFILTRATION; INTRACAUDAL; PERINEURAL AS NEEDED
Status: DISCONTINUED | OUTPATIENT
Start: 2024-12-13 | End: 2024-12-13 | Stop reason: HOSPADM

## 2024-12-13 RX ORDER — ACETAMINOPHEN 325 MG/1
650 TABLET ORAL EVERY 6 HOURS PRN
Status: DISCONTINUED | OUTPATIENT
Start: 2024-12-13 | End: 2024-12-13 | Stop reason: HOSPADM

## 2024-12-13 RX ORDER — LIDOCAINE HYDROCHLORIDE AND EPINEPHRINE 10; 10 MG/ML; UG/ML
INJECTION, SOLUTION INFILTRATION; PERINEURAL AS NEEDED
Status: DISCONTINUED | OUTPATIENT
Start: 2024-12-13 | End: 2024-12-13 | Stop reason: HOSPADM

## 2024-12-13 NOTE — INTERVAL H&P NOTE
H&P reviewed. After examining the patient I find no changes in the patients condition since the H&P had been written.    Pt prefers bilateral carpal tunnel releases today. Consent on chart.     Vitals:    12/13/24 0614   BP: 128/80   Pulse: 62   Resp: 20   Temp: 97.9 °F (36.6 °C)   SpO2: 99%

## 2024-12-13 NOTE — OP NOTE
OPERATIVE REPORT  PATIENT NAME: Garcia Rivera    :  1980  MRN: [id number]  Pt Location: [facility] OR ROOM 06    SURGERY DATE: 2024    Surgeons and Role:     * Jeffrey Costello MD - Primary     * Naveen Bojorquez PA-C - Assisting    Preop Diagnosis:  Carpal tunnel syndrome, bilateral [G56.03]    Post-Op Diagnosis Codes:     * Carpal tunnel syndrome, bilateral [G56.03]    Procedure(s):  Bilateral - RELEASE CARPAL TUNNEL. BILATERAL    Specimen(s):  * No specimens in log *    Estimated Blood Loss:   Minimal    Drains:  * No LDAs found *    Anesthesia Type:   Local    Operative Indications:  Carpal tunnel syndrome, bilateral [G56.03]      Operative Findings:  Healthy appearing nerves after release      Complications:   None    Procedure and Technique:  The patient's bilateral hands were cleansed with alcohol.  A field block was performed using marcaine 0.25% with epinephrine and lidocaine 1% with epinephrine in a 50-50 mixture at both sites.  The bilateral upper extremities were prepped and draped in a sterile fashion.  A longitudinal incision was made overlying the transverse carpal ligament in line with the ring finger in a flexed position at both sides.  Sharp dissection was performed down to the level of the transverse carpal ligaments.  A Weitlaner was used for counterretraction.  The transverse carpal ligaments were divided with a 15 blade scalpel distally, and tenotomy scissors proximally along with a portion of the distal antebrachial fascia.  The wounds were irrigated copiously with normal saline.  The skin was approximated with 4-0 nylon in an interrupted horizontal mattress fashion.  Xeroform was applied followed by gauze and an ace bandage over wrap.  The patient was transferred to recovery room in stable condition.      I was present for the entire procedure.    Patient Disposition:  PACU     My Assistant was necessary throughout the procedure(s) for retraction and positioning.    I understand that  section 1842 (b)(7)(D) of the Social Security Act generally prohibits Medicare physician fee schedule payment for the services of assistants-at-surgery in teaching hospitals when qualified residents are available to furnish such services. I certify that the services for which payment is claimed were medically necessary, and that no qualified resident was available to perform the services. I further understand that these services are subject to post-payment review by the Medicare carrier.           SIGNATURE: Jeffrey Costello MD  DATE: December 13, 2024  TIME: 7:53 AM

## 2024-12-14 DIAGNOSIS — G56.03 BILATERAL CARPAL TUNNEL SYNDROME: ICD-10-CM

## 2024-12-16 RX ORDER — NAPROXEN 500 MG/1
TABLET ORAL
Qty: 10 TABLET | Refills: 1 | Status: SHIPPED | OUTPATIENT
Start: 2024-12-16

## 2024-12-23 ENCOUNTER — OFFICE VISIT (OUTPATIENT)
Dept: OBGYN CLINIC | Facility: CLINIC | Age: 44
End: 2024-12-23

## 2024-12-23 VITALS — HEIGHT: 71 IN | BODY MASS INDEX: 25.48 KG/M2 | WEIGHT: 182 LBS

## 2024-12-23 DIAGNOSIS — G56.03 BILATERAL CARPAL TUNNEL SYNDROME: Primary | ICD-10-CM

## 2024-12-23 PROCEDURE — 99024 POSTOP FOLLOW-UP VISIT: CPT | Performed by: SURGERY

## 2024-12-23 NOTE — PROGRESS NOTES
Assessment/Plan:  Patient ID: Garcia Rivera 44 y.o. male   Surgery: Release Carpal Tunnel, Bilateral - Bilateral  Date of Surgery: 12/13/2024    Patient is 10 days status post bilateral carpal tunnel release on 12/13/2024.  Sutures were removed at today's visit.  Patient tolerated this well.  Patient may continue OTC NSAIDs and/or Tylenol as needed for pain control.  Encouraged patient to initiate scar tissue massage.   Patient may slowly return to activity as tolerated without restrictions.  Patient understands to gradually transition into heavy lifting.     Follow Up:  1  month(s)    To Do Next Visit:  Reevaluation       CHIEF COMPLAINT:  Chief Complaint   Patient presents with    Right Hand - Follow-up, Post-op    Left Hand - Follow-up, Post-op         SUBJECTIVE:  Garcia Rivera is a 44 y.o. year old male who presents for follow up after Release Carpal Tunnel, Bilateral - Bilateral. Today patient states today that he will overall is doing well.  Patient states he has been avoiding heavy lifting since surgery.  Patient states he does get occasional numbness within his thumb index and middle finger but states it has been improving since surgery.  Patient states his night pain and numbness and tingling has gone away.  Patient states he is overall pleased with his progress.      PHYSICAL EXAMINATION:  General: well developed and well nourished, alert, oriented times 3, and appears comfortable  Psychiatric: Normal    MUSCULOSKELETAL EXAMINATION:  Incision: Clean, dry, intact  Surgery Site: normal, no evidence of infection   Range of Motion: As expected  Neurovascular status: Neuro intact, good cap refill  Activity Restrictions: No restrictions         LABS REVIEWED:    HgA1c:   Lab Results   Component Value Date    HGBA1C 5.9 (H) 04/13/2024     BMP:   Lab Results   Component Value Date    CALCIUM 9.0 04/13/2024    K 4.6 04/13/2024    CO2 27 04/13/2024     04/13/2024    BUN 19 04/13/2024    CREATININE  0.93 04/13/2024           PROCEDURES PERFORMED:  Procedures  No Procedures performed today    Scribe Attestation      I,:  David Elder am acting as a scribe while in the presence of the attending physician.:       I,:  Jeffrey Costello MD personally performed the services described in this documentation    as scribed in my presence.:

## 2024-12-27 ENCOUNTER — TELEPHONE (OUTPATIENT)
Age: 44
End: 2024-12-27

## 2024-12-27 NOTE — TELEPHONE ENCOUNTER
Caller: Patient     Doctor: Pravin     Reason for call: Patient asked for a letter to return to work, Please call patient once completed     Call back#: [phone]

## 2025-04-11 ENCOUNTER — OFFICE VISIT (OUTPATIENT)
Dept: INTERNAL MEDICINE CLINIC | Facility: CLINIC | Age: 45
End: 2025-04-11
Payer: COMMERCIAL

## 2025-04-11 VITALS
DIASTOLIC BLOOD PRESSURE: 73 MMHG | HEIGHT: 71 IN | HEART RATE: 61 BPM | TEMPERATURE: 97.3 F | BODY MASS INDEX: 25.76 KG/M2 | WEIGHT: 184 LBS | SYSTOLIC BLOOD PRESSURE: 128 MMHG

## 2025-04-11 DIAGNOSIS — Z12.5 SCREENING FOR PROSTATE CANCER: ICD-10-CM

## 2025-04-11 DIAGNOSIS — R73.01 IMPAIRED FASTING GLUCOSE: ICD-10-CM

## 2025-04-11 DIAGNOSIS — G56.03 BILATERAL CARPAL TUNNEL SYNDROME: ICD-10-CM

## 2025-04-11 DIAGNOSIS — R53.82 CHRONIC FATIGUE: ICD-10-CM

## 2025-04-11 DIAGNOSIS — Z00.00 ANNUAL PHYSICAL EXAM: Primary | ICD-10-CM

## 2025-04-11 PROBLEM — E66.3 OVERWEIGHT (BMI 25.0-29.9): Status: RESOLVED | Noted: 2023-01-17 | Resolved: 2025-04-11

## 2025-04-11 PROBLEM — M25.511 ACUTE PAIN OF RIGHT SHOULDER: Status: RESOLVED | Noted: 2024-04-03 | Resolved: 2025-04-11

## 2025-04-11 PROBLEM — R35.0 URINARY FREQUENCY: Status: RESOLVED | Noted: 2024-04-03 | Resolved: 2025-04-11

## 2025-04-11 PROBLEM — Z30.09 STERILIZATION CONSULT: Status: RESOLVED | Noted: 2023-01-17 | Resolved: 2025-04-11

## 2025-04-11 PROCEDURE — 99396 PREV VISIT EST AGE 40-64: CPT | Performed by: STUDENT IN AN ORGANIZED HEALTH CARE EDUCATION/TRAINING PROGRAM

## 2025-04-11 PROCEDURE — 99214 OFFICE O/P EST MOD 30 MIN: CPT | Performed by: STUDENT IN AN ORGANIZED HEALTH CARE EDUCATION/TRAINING PROGRAM

## 2025-04-11 NOTE — ASSESSMENT & PLAN NOTE
Status post bilateral carpal tunnel surgery in December 2024  Continues to experience some weakness particularly opening tight bottles or heavy objects but in general his pain has resolved and function has significantly improved  I explained to him that the recovery process after carpal tunnel sometimes may take up to 12 months

## 2025-04-11 NOTE — ASSESSMENT & PLAN NOTE
Complains of persistent fatigue for approximately 4 months  Reports low energy level most of the days however does not feel that this is a barrier to engage in daily life  On further questioning, reports he sleeps 4 to 6 hours overnight however his sleep is often fragmented due to interruptions by his kids who are 3 and 6 years old.  Reports his wife has never complained of him snoring  I advised him to work on quality of his sleep as a first step although this is obviously not an easy thing to do with his children.  Will proceed with some labs to rule out an organic cause  May consider a sleep study in the future

## 2025-04-11 NOTE — PROGRESS NOTES
Adult Annual Physical  Name: Garcia Rivera      : 1980      MRN: [id number]  Encounter Provider: Matt Wing MD  Encounter Date: 2025   Encounter department: [facility]      Presents for annual physical exam and follow-up on chronic conditions      Assessment & Plan  Annual physical exam  Nutrition, exercise, sleep, hearing vision and dental health reviewed  Preventive medicine reviewed    Orders:    CBC; Future    Comprehensive metabolic panel; Future    TSH, 3rd generation with Free T4 reflex; Future    Lipid Panel with Direct LDL reflex; Future    Chronic fatigue  Complains of persistent fatigue for approximately 4 months  Reports low energy level most of the days however does not feel that this is a barrier to engage in daily life  On further questioning, reports he sleeps 4 to 6 hours overnight however his sleep is often fragmented due to interruptions by his kids who are 3 and 6 years old.  Reports his wife has never complained of him snoring  I advised him to work on quality of his sleep as a first step although this is obviously not an easy thing to do with his children.  Will proceed with some labs to rule out an organic cause  May consider a sleep study in the future         Impaired fasting glucose  Lab Results   Component Value Date    HGBA1C 5.9 (H) 2024    HGBA1C 5.6 2023     Managed with lifestyle modification including diet and exercise  A1c ordered    Orders:    Hemoglobin A1C; Future    Screening for prostate cancer  We have agreed on proceeding with prostate cancer screening after discussing the role of PSA, current guidelines, benefits of screening and current controversies, along possible factors that lead to elevated PSA values.     Orders:    PSA, Total Screen; Future    Bilateral carpal tunnel syndrome  Status post bilateral carpal tunnel surgery in 2024  Continues to experience some weakness particularly opening tight  "bottles or heavy objects but in general his pain has resolved and function has significantly improved  I explained to him that the recovery process after carpal tunnel sometimes may take up to 12 months             Preventive Screenings:    - Prostate cancer screening: screening not indicated     Immunizations:  - Immunizations due: Influenza and Tdap         History of Present Illness     Adult Annual Physical:  Patient presents for annual physical.     Diet and Physical Activity:  - Diet/Nutrition: no special diet.  - Exercise: vigorous cardiovascular exercise, 5-7 times a week on average and more than 2 hours on average.    Depression Screening:  - PHQ-2 Score: 1    General Health:  - Sleep: 4-6 hours of sleep on average.  - Hearing: normal hearing right ear, normal hearing left ear and normal hearing bilateral ears.  - Vision: most recent eye exam > 1 year ago.  - Dental: no dental visits for > 1 year.    /GYN Health:  - Follows with GYN: no.   - History of STDs: no     Health:  - History of STDs: no.   - Urinary symptoms: none.     Advanced Care Planning:  - Has an advanced directive?: no    - Has a durable medical POA?: no      Review of Systems      Objective   /73 (BP Location: Left arm, Patient Position: Sitting, Cuff Size: Standard)   Pulse 61   Temp (!) 97.3 °F (36.3 °C)   Ht 5' 11\" (1.803 m)   Wt 83.5 kg (184 lb)   BMI 25.66 kg/m²     Physical Exam    "

## 2025-04-11 NOTE — PATIENT INSTRUCTIONS
"Patient Education     Routine physical for adults   The Basics   Written by the doctors and editors at [facility]   What is a physical? -- A physical is a routine visit, or \"check-up,\" with your doctor. You might also hear it called a \"wellness visit\" or \"preventive visit.\"  During each visit, the doctor will:   Ask about your physical and mental health   Ask about your habits, behaviors, and lifestyle   Do an exam   Give you vaccines if needed   Talk to you about any medicines you take   Give advice about your health   Answer your questions  Getting regular check-ups is an important part of taking care of your health. It can help your doctor find and treat any problems you have. But it's also important for preventing health problems.  A routine physical is different from a \"sick visit.\" A sick visit is when you see a doctor because of a health concern or problem. Since physicals are scheduled ahead of time, you can think about what you want to ask the doctor.  How often should I get a physical? -- It depends on your age and health. In general, for people age 21 years and older:   If you are younger than 50 years, you might be able to get a physical every 3 years.   If you are 50 years or older, your doctor might recommend a physical every year.  If you have an ongoing health condition, like diabetes or high blood pressure, your doctor will probably want to see you more often.  What happens during a physical? -- In general, each visit will include:   Physical exam - The doctor or nurse will check your height, weight, heart rate, and blood pressure. They will also look at your eyes and ears. They will ask about how you are feeling and whether you have any symptoms that bother you.   Medicines - It's a good idea to bring a list of all the medicines you take to each doctor visit. Your doctor will talk to you about your medicines and answer any questions. Tell them if you are having any side effects that bother you. You " "should also tell them if you are having trouble paying for any of your medicines.   Habits and behaviors - This includes:   Your diet   Your exercise habits   Whether you smoke, drink alcohol, or use drugs   Whether you are sexually active   Whether you feel safe at home  Your doctor will talk to you about things you can do to improve your health and lower your risk of health problems. They will also offer help and support. For example, if you want to quit smoking, they can give you advice and might prescribe medicines. If you want to improve your diet or get more physical activity, they can help you with this, too.   Lab tests, if needed - The tests you get will depend on your age and situation. For example, your doctor might want to check your:   Cholesterol   Blood sugar   Iron level   Vaccines - The recommended vaccines will depend on your age, health, and what vaccines you already had. Vaccines are very important because they can prevent certain serious or deadly infections.   Discussion of screening - \"Screening\" means checking for diseases or other health problems before they cause symptoms. Your doctor can recommend screening based on your age, risk, and preferences. This might include tests to check for:   Cancer, such as breast, prostate, cervical, ovarian, colorectal, prostate, lung, or skin cancer   Sexually transmitted infections, such as chlamydia and gonorrhea   Mental health conditions like depression and anxiety  Your doctor will talk to you about the different types of screening tests. They can help you decide which screenings to have. They can also explain what the results might mean.   Answering questions - The physical is a good time to ask the doctor or nurse questions about your health. If needed, they can refer you to other doctors or specialists, too.  Adults older than 65 years often need other care, too. As you get older, your doctor will talk to you about:   How to prevent falling at " home   Hearing or vision tests   Memory testing   How to take your medicines safely   Making sure that you have the help and support you need at home  All topics are updated as new evidence becomes available and our peer review process is complete.  This topic retrieved from [employer] on: May 02, 2024.  Topic 744702 Version 1.0  Release: 32.4.3 - C32.122  © 2024 UpToDate, Inc. and/or its affiliates. All rights reserved.  Consumer Information Use and Disclaimer   Disclaimer: This generalized information is a limited summary of diagnosis, treatment, and/or medication information. It is not meant to be comprehensive and should be used as a tool to help the user understand and/or assess potential diagnostic and treatment options. It does NOT include all information about conditions, treatments, medications, side effects, or risks that may apply to a specific patient. It is not intended to be medical advice or a substitute for the medical advice, diagnosis, or treatment of a health care provider based on the health care provider's examination and assessment of a patient's specific and unique circumstances. Patients must speak with a health care provider for complete information about their health, medical questions, and treatment options, including any risks or benefits regarding use of medications. This information does not endorse any treatments or medications as safe, effective, or approved for treating a specific patient. UpToDate, Inc. and its affiliates disclaim any warranty or liability relating to this information or the use thereof.The use of this information is governed by the Terms of Use, available at https://www.woltersHingiuwer.com/en/know/clinical-effectiveness-terms. 2024© UpToDate, Inc. and its affiliates and/or licensors. All rights reserved.  Copyright   © 2024 UpToDate, Inc. and/or its affiliates. All rights reserved.

## 2025-04-11 NOTE — ASSESSMENT & PLAN NOTE
Lab Results   Component Value Date    HGBA1C 5.9 (H) 04/13/2024    HGBA1C 5.6 01/21/2023     Managed with lifestyle modification including diet and exercise  A1c ordered    Orders:    Hemoglobin A1C; Future

## 2025-04-12 ENCOUNTER — APPOINTMENT (OUTPATIENT)
Dept: LAB | Age: 45
End: 2025-04-12
Payer: COMMERCIAL

## 2025-04-12 DIAGNOSIS — Z00.00 ANNUAL PHYSICAL EXAM: ICD-10-CM

## 2025-04-12 DIAGNOSIS — Z12.5 SCREENING FOR PROSTATE CANCER: ICD-10-CM

## 2025-04-12 DIAGNOSIS — R73.01 IMPAIRED FASTING GLUCOSE: ICD-10-CM

## 2025-04-12 LAB
ALBUMIN SERPL BCG-MCNC: 4.5 G/DL (ref 3.5–5)
ALP SERPL-CCNC: 38 U/L (ref 34–104)
ALT SERPL W P-5'-P-CCNC: 17 U/L (ref 7–52)
ANION GAP SERPL CALCULATED.3IONS-SCNC: 6 MMOL/L (ref 4–13)
AST SERPL W P-5'-P-CCNC: 19 U/L (ref 13–39)
BILIRUB SERPL-MCNC: 0.43 MG/DL (ref 0.2–1)
BUN SERPL-MCNC: 18 MG/DL (ref 5–25)
CALCIUM SERPL-MCNC: 9.6 MG/DL (ref 8.4–10.2)
CHLORIDE SERPL-SCNC: 104 MMOL/L (ref 96–108)
CHOLEST SERPL-MCNC: 170 MG/DL (ref ?–200)
CO2 SERPL-SCNC: 31 MMOL/L (ref 21–32)
CREAT SERPL-MCNC: 0.82 MG/DL (ref 0.6–1.3)
ERYTHROCYTE [DISTWIDTH] IN BLOOD BY AUTOMATED COUNT: 12.5 % (ref 11.6–15.1)
EST. AVERAGE GLUCOSE BLD GHB EST-MCNC: 114 MG/DL
GFR SERPL CREATININE-BSD FRML MDRD: 109 ML/MIN/1.73SQ M
GLUCOSE P FAST SERPL-MCNC: 90 MG/DL (ref 65–99)
HBA1C MFR BLD: 5.6 %
HCT VFR BLD AUTO: 44.4 % (ref 36.5–49.3)
HDLC SERPL-MCNC: 61 MG/DL
HGB BLD-MCNC: 13.8 G/DL (ref 12–17)
LDLC SERPL CALC-MCNC: 96 MG/DL (ref 0–100)
MCH RBC QN AUTO: 29.8 PG (ref 26.8–34.3)
MCHC RBC AUTO-ENTMCNC: 31.1 G/DL (ref 31.4–37.4)
MCV RBC AUTO: 96 FL (ref 82–98)
PLATELET # BLD AUTO: 273 THOUSANDS/UL (ref 149–390)
PMV BLD AUTO: 10.8 FL (ref 8.9–12.7)
POTASSIUM SERPL-SCNC: 4.9 MMOL/L (ref 3.5–5.3)
PROT SERPL-MCNC: 7 G/DL (ref 6.4–8.4)
PSA SERPL-MCNC: 0.6 NG/ML (ref 0–4)
RBC # BLD AUTO: 4.63 MILLION/UL (ref 3.88–5.62)
SODIUM SERPL-SCNC: 141 MMOL/L (ref 135–147)
TRIGL SERPL-MCNC: 65 MG/DL (ref ?–150)
TSH SERPL DL<=0.05 MIU/L-ACNC: 1.19 UIU/ML (ref 0.45–4.5)
WBC # BLD AUTO: 3.46 THOUSAND/UL (ref 4.31–10.16)

## 2025-04-12 PROCEDURE — 36415 COLL VENOUS BLD VENIPUNCTURE: CPT

## 2025-04-12 PROCEDURE — 83036 HEMOGLOBIN GLYCOSYLATED A1C: CPT

## 2025-04-12 PROCEDURE — 80061 LIPID PANEL: CPT

## 2025-04-12 PROCEDURE — G0103 PSA SCREENING: HCPCS

## 2025-04-12 PROCEDURE — 80053 COMPREHEN METABOLIC PANEL: CPT

## 2025-04-12 PROCEDURE — 84443 ASSAY THYROID STIM HORMONE: CPT

## 2025-04-12 PROCEDURE — 85027 COMPLETE CBC AUTOMATED: CPT

## 2025-04-14 ENCOUNTER — RESULTS FOLLOW-UP (OUTPATIENT)
Dept: INTERNAL MEDICINE CLINIC | Facility: CLINIC | Age: 45
End: 2025-04-14

## 2025-06-03 ENCOUNTER — APPOINTMENT (OUTPATIENT)
Age: 45
End: 2025-06-03
Attending: PHYSICIAN ASSISTANT
Payer: COMMERCIAL

## 2025-06-03 ENCOUNTER — OFFICE VISIT (OUTPATIENT)
Age: 45
End: 2025-06-03
Payer: COMMERCIAL

## 2025-06-03 VITALS — BODY MASS INDEX: 25.77 KG/M2 | WEIGHT: 184.08 LBS | HEIGHT: 71 IN

## 2025-06-03 DIAGNOSIS — S39.012A LUMBAR STRAIN, INITIAL ENCOUNTER: Primary | ICD-10-CM

## 2025-06-03 DIAGNOSIS — M54.50 LOW BACK PAIN, UNSPECIFIED BACK PAIN LATERALITY, UNSPECIFIED CHRONICITY, UNSPECIFIED WHETHER SCIATICA PRESENT: ICD-10-CM

## 2025-06-03 PROCEDURE — 99214 OFFICE O/P EST MOD 30 MIN: CPT | Performed by: PHYSICIAN ASSISTANT

## 2025-06-03 PROCEDURE — 72110 X-RAY EXAM L-2 SPINE 4/>VWS: CPT

## 2025-06-03 RX ORDER — PREDNISONE 10 MG/1
TABLET ORAL
Qty: 42 TABLET | Refills: 0 | Status: SHIPPED | OUTPATIENT
Start: 2025-06-03

## 2025-06-03 NOTE — PROGRESS NOTES
Assessment:  Assessment & Plan  Lumbar strain, initial encounter  Axial lumbar spine pain, likely myofascial.  Orders:    XR spine lumbar minimum 4 views non injury; Future    Ambulatory Referral to Physical Therapy; Future    predniSONE 10 mg tablet; Take 6 tabs days 1&2, 5 tabs days 3&4, 4 tabs days 5&6, 3 tabs days 7&8, 2 tabs days 9&10, 1 tab days 11&12. Take with food and spread pills out with meals. For example on days 1 and 2 take 2 tabs with breakfast, 2 tabs with lunch, and 2 tabs with dinner.  No red flags appreciated on examination today.  Prescription for prednisone taper.  Advised against taking oral NSAIDs while taking prednisone taper.  Oral NSAIDs may be resumed once prednisone taper is complete.  Referral to physical therapy.  Activities as tolerated with modification to avoid pain.  Follow-up in 6 weeks.  Consider advanced imaging if pain persists.           To do next visit:  Return for 6 weeks with Dirk Doss.    The above stated was discussed in layman's terms and the patient expressed understanding.  All questions were answered to the patient's satisfaction.         Subjective:   Garcia Rivera is a 44 y.o. male who presents to the office today for evaluation of his lumbar spine.  He notes an onset of pain a few weeks ago.  He denies any injury or trauma.  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  He notes axial lumbar spine pain with intermittent radiation into his left hip region.  He denies any instability or weakness.  He denies any radiating pain distally into the lower extremities.  He denies any numbness and tingling.  Pain is worse with prolonged sitting as well as increased activity.  Pain can reach 6 out of 10 in intensity at its worst.  He has been taking ibuprofen with some improvement.  He denies any bowel or bladder dysfunction.  No saddle paresthesias.      Review of  "systems negative unless otherwise specified in HPI      Past Medical History[1]    Past Surgical History[2]    Family History[3]    Social History     Occupational History     Comment: [employer] as a    Tobacco Use    Smoking status: Never    Smokeless tobacco: Never   Vaping Use    Vaping status: Former   Substance and Sexual Activity    Alcohol use: Yes     Comment: Weekend/social    Drug use: Never    Sexual activity: Yes     Partners: Female       Current Medications[4]    Allergies[5]       There were no vitals filed for this visit.    Objective:  Gen: No acute distress, resting comfortably in bed  HEENT: Eyes clear, moist mucus membranes, hearing intact  Respiratory: No audible wheezing or stridor  Cardiovascular: Well Perfused peripherally, 2+ distal pulse  Abdomen: nondistended, no peritoneal signs                     Lumbar spine: No tenderness midline and paraspinal musculature bilaterally.  No tenderness bilateral SI joints and bilateral piriformis musculature.  Motor/sensation intact L2-S1 bilaterally with 5 out of 5 strength.  Negative straight leg raise bilaterally.  Negative MANNY test bilaterally.    Diagnostics, reviewed and taken today if performed as documented:  I personally reviewed the pertinent films in PACS and interpretation is as follows:    X-rays lumbar spine 6/3/2025: No acute osseous abnormality.  No fracture or signs of instability.  No significant degenerative changes.    Portions of the record may have been created with voice recognition software.  Occasional wrong word or \"sound a like\" substitutions may have occurred due to the inherent limitations of voice recognition software.  Read the chart carefully and recognize, using context, where substitutions have occurred.         [1] No past medical history on file.  [2]   Past Surgical History:  Procedure Laterality Date    LA NEUROPLASTY &/TRANSPOS MEDIAN NRV CARPAL TUNNE Bilateral 12/13/2024    Procedure: RELEASE CARPAL " TUNNEL, BILATERAL;  Surgeon: Jeffrey Costello MD;  Location: [facility];  Service: Orthopedics    VASECTOMY  05/19/2023    Dr. Filippo Meeks   [3]   Family History  Problem Relation Name Age of Onset    Diabetes Mother     [4]   Current Outpatient Medications:     predniSONE 10 mg tablet, Take 6 tabs days 1&2, 5 tabs days 3&4, 4 tabs days 5&6, 3 tabs days 7&8, 2 tabs days 9&10, 1 tab days 11&12. Take with food and spread pills out with meals. For example on days 1 and 2 take 2 tabs with breakfast, 2 tabs with lunch, and 2 tabs with dinner., Disp: 42 tablet, Rfl: 0    ibuprofen (MOTRIN) 400 mg tablet, Take by mouth every 6 (six) hours as needed for mild pain, Disp: , Rfl:     naproxen (NAPROSYN) 500 mg tablet, take 1 tablet by mouth twice a day with meals for 5 days (Patient not taking: Reported on 4/11/2025), Disp: 10 tablet, Rfl: 1  [5] No Known Allergies

## 2025-06-24 ENCOUNTER — EVALUATION (OUTPATIENT)
Dept: PHYSICAL THERAPY | Facility: REHABILITATION | Age: 45
End: 2025-06-24
Attending: PHYSICIAN ASSISTANT
Payer: COMMERCIAL

## 2025-06-24 DIAGNOSIS — S39.012A LUMBAR STRAIN, INITIAL ENCOUNTER: ICD-10-CM

## 2025-06-24 PROCEDURE — 97161 PT EVAL LOW COMPLEX 20 MIN: CPT | Performed by: PHYSICAL THERAPIST

## 2025-06-24 PROCEDURE — 97110 THERAPEUTIC EXERCISES: CPT | Performed by: PHYSICAL THERAPIST

## 2025-06-24 NOTE — PROGRESS NOTES
PT Evaluation     Today's date: 2025  Patient name: Garcia Rivera  : 1980  MRN: [id number]  Referring provider: Dirk Doss P*  Dx:   Encounter Diagnosis     ICD-10-CM    1. Lumbar strain, initial encounter  S39.012A Ambulatory Referral to Physical Therapy                     Assessment  Impairments: abnormal or restricted ROM, lacks appropriate home exercise program and pain with function    Assessment details: Garcia Rivera is a 44 y.o. male who presents with pain and decreased ROM.  Due to these impairments, patient has difficulty performing a/iadls, recreational activities, and work-related activities.  Patient demonstrates signs and symptoms consistent with L/S radiculopathy with an current extension preference of the L/S.  Patient would benefit from skilled physical therapy to address their aforementioned impairments, improve their level of function and to improve their overall quality of life.  Patient states he will only be able to attend PT one time per week secondary to work schedule.    Goals  Short term goals - to be achieved in 4 weeks:    Decrease pain 20-50%.   Improve L/S ROM by 25%.  Long term goals - to be achieved by discharge:    Lifting is improved to maximal level of function.    Performance in related work activities is improved to maximal level of function.    IADL performance in related activities is improved to maximal level of function.    Sitting tolerance is improved to maximal level of function.        Plan    Planned therapy interventions: manual therapy, neuromuscular re-education, patient/caregiver education, postural training, strengthening, stretching, therapeutic activities, therapeutic exercise and home exercise program    Frequency: 1x week  Duration in weeks: 8  Plan of Care beginning date: 2025  Plan of Care expiration date: 2025  Treatment plan discussed with: patient        Subjective Evaluation    History of Present  Illness  Mechanism of injury: Patient refers to PT with c/o pain in his low back which began approximately three months previous of insidious onset.    X-rays of the L/S taken on 6/3/25 revealed age-appropriate lumbar degenerative changes,no acute fracture.  Patient c/o radiating  intermittent pain in his left lateral thigh proximal to the knee joint, patient also c/o intermittent radiating pain in right lateral thigh proximal to the knee joint (left greater than right).  Patient denies numbness and tingling in bilateral LE's.  Patient denies bowel or bladder changes.  Patient denies LE weakness.  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  Patient c/o pain with extended sitting.       Pain  Current pain ratin  At best pain ratin  At worst pain ratin          Objective     Neurological Testing     Sensation     Lumbar   Left   Intact: light touch    Right   Intact: light touch    Active Range of Motion     Lumbar   Flexion:  Restriction level: minimal  Extension:  Restriction level: moderate  Left lateral flexion:  Restriction level: minimal  Right lateral flexion:  Restriction level: minimal    Strength/Myotome Testing     Left Hip   Planes of Motion   Flexion: 5    Right Hip   Planes of Motion   Flexion: 5    Left Knee   Flexion: 5  Extension: 5    Right Knee   Flexion: 5  Extension: 5    Left Ankle/Foot   Dorsiflexion: 5  Plantar flexion: 5    Right Ankle/Foot   Dorsiflexion: 5  Plantar flexion: 5    General Comments:      Lumbar Comments  Repeated flexion in standing: No effect  Repeated extension in standing: Produces, no worse  Repeated extension in lying: Decreased pain, better             Precautions: None      Manuals                                                                 Neuro Re-Ed             Sup TA activation             Sup TA with marches             Sup TA with alt UE/LE             Bridges             Planks             Side planks                           Ther Ex             Treadmill             L/S ext in stand HEP            Prone press ups HEP            Menno rows             Kallie lat pull downs             Menno Multifidus press                                       Ther Activity                                       Gait Training                                       Modalities

## 2025-06-26 ENCOUNTER — OFFICE VISIT (OUTPATIENT)
Dept: URGENT CARE | Age: 45
End: 2025-06-26
Payer: COMMERCIAL

## 2025-06-26 ENCOUNTER — APPOINTMENT (EMERGENCY)
Dept: RADIOLOGY | Facility: HOSPITAL | Age: 45
End: 2025-06-26
Payer: COMMERCIAL

## 2025-06-26 ENCOUNTER — HOSPITAL ENCOUNTER (EMERGENCY)
Facility: HOSPITAL | Age: 45
Discharge: HOME | End: 2025-06-26
Attending: EMERGENCY MEDICINE | Admitting: EMERGENCY MEDICINE
Payer: COMMERCIAL

## 2025-06-26 VITALS
SYSTOLIC BLOOD PRESSURE: 122 MMHG | HEIGHT: 71 IN | WEIGHT: 180 LBS | OXYGEN SATURATION: 99 % | DIASTOLIC BLOOD PRESSURE: 74 MMHG | RESPIRATION RATE: 18 BRPM | HEART RATE: 84 BPM | TEMPERATURE: 97.7 F | BODY MASS INDEX: 25.2 KG/M2

## 2025-06-26 VITALS
OXYGEN SATURATION: 99 % | HEART RATE: 84 BPM | RESPIRATION RATE: 18 BRPM | SYSTOLIC BLOOD PRESSURE: 128 MMHG | TEMPERATURE: 98.8 F | DIASTOLIC BLOOD PRESSURE: 64 MMHG

## 2025-06-26 DIAGNOSIS — N50.89 TESTICULAR SWELLING, LEFT: ICD-10-CM

## 2025-06-26 DIAGNOSIS — N45.1 EPIDIDYMITIS: Primary | ICD-10-CM

## 2025-06-26 DIAGNOSIS — N50.812 PAIN IN LEFT TESTICLE: Primary | ICD-10-CM

## 2025-06-26 LAB
BILIRUB UR QL STRIP: NEGATIVE
CLARITY UR: CLEAR
COLOR UR: ABNORMAL
GLUCOSE UR STRIP-MCNC: NEGATIVE MG/DL
HGB UR QL STRIP.AUTO: NEGATIVE
KETONES UR STRIP-MCNC: NEGATIVE MG/DL
LEUKOCYTE ESTERASE UR QL STRIP: NEGATIVE
NITRITE UR QL STRIP: NEGATIVE
PH UR STRIP.AUTO: 6 [PH]
PROT UR STRIP-MCNC: NEGATIVE MG/DL
SP GR UR STRIP.AUTO: 1.03 (ref 1–1.03)
UROBILINOGEN UR STRIP-ACNC: <2 MG/DL

## 2025-06-26 PROCEDURE — 76870 US EXAM SCROTUM: CPT

## 2025-06-26 PROCEDURE — 87591 N.GONORRHOEAE DNA AMP PROB: CPT

## 2025-06-26 PROCEDURE — 99215 OFFICE O/P EST HI 40 MIN: CPT | Performed by: PHYSICIAN ASSISTANT

## 2025-06-26 PROCEDURE — 87086 URINE CULTURE/COLONY COUNT: CPT

## 2025-06-26 PROCEDURE — 87491 CHLMYD TRACH DNA AMP PROBE: CPT

## 2025-06-26 PROCEDURE — 81003 URINALYSIS AUTO W/O SCOPE: CPT

## 2025-06-26 PROCEDURE — 99284 EMERGENCY DEPT VISIT MOD MDM: CPT | Performed by: EMERGENCY MEDICINE

## 2025-06-26 RX ORDER — LEVOFLOXACIN 500 MG/1
500 TABLET, FILM COATED ORAL EVERY 24 HOURS
Qty: 10 TABLET | Refills: 0 | Status: SHIPPED | OUTPATIENT
Start: 2025-06-26 | End: 2025-06-26

## 2025-06-26 RX ORDER — LEVOFLOXACIN 500 MG/1
500 TABLET, FILM COATED ORAL ONCE
Status: COMPLETED | OUTPATIENT
Start: 2025-06-26 | End: 2025-06-26

## 2025-06-26 RX ORDER — LEVOFLOXACIN 500 MG/1
500 TABLET, FILM COATED ORAL EVERY 24 HOURS
Qty: 9 TABLET | Refills: 0 | Status: SHIPPED | OUTPATIENT
Start: 2025-06-27 | End: 2025-07-06

## 2025-06-26 RX ORDER — KETOROLAC TROMETHAMINE 30 MG/ML
15 INJECTION, SOLUTION INTRAMUSCULAR; INTRAVENOUS ONCE
Status: COMPLETED | OUTPATIENT
Start: 2025-06-26 | End: 2025-06-26

## 2025-06-26 RX ADMIN — KETOROLAC TROMETHAMINE 15 MG: 30 INJECTION, SOLUTION INTRAMUSCULAR; INTRAVENOUS at 17:05

## 2025-06-26 RX ADMIN — LEVOFLOXACIN 500 MG: 500 TABLET, FILM COATED ORAL at 18:18

## 2025-06-26 NOTE — DISCHARGE INSTRUCTIONS
Testicle pain.  Your ultrasound shows epididymitis which is inflammation of the part of your testicle.    Take antibiotic for the next 10 days.  We sent off additional testing we will call if antibiotic needs be changed.    Please follow with primary doctor and urology, referral placed.    If you have severe pain, worsening swelling, fevers or other concerning symptoms.

## 2025-06-26 NOTE — ED PROVIDER NOTES
Time reflects when diagnosis was documented in both MDM as applicable and the Disposition within this note       Time User Action Codes Description Comment    6/26/2025  5:38 PM Millie Frank Add [N45.1] Epididymitis           ED Disposition       ED Disposition   Discharge    Condition   Stable    Date/Time   Thu Jun 26, 2025  5:38 PM    Comment   Garcia Rivera discharge to home/self care.                   Assessment & Plan       Medical Decision Making  Amount and/or Complexity of Data Reviewed  Labs: ordered. Decision-making details documented in ED Course.  Radiology: ordered. Decision-making details documented in ED Course.    Risk  Prescription drug management.    Patient is 44 y.o. male with no PMH presenting to ED for evaluation left testicle pain and swelling See history and physical documented below.       Differential diagnosis included but not limited to testicular torsion, epididymitis, orchitis, hydrocele. Plan testicular US, UA. Will give Toradol for pain    View ED course below for further discussion on patient workup.     All labs reviewed and utilized in the medical decision making process  All radiology studies independently viewed by me and interpreted by the radiologist.  I reviewed all testing with the patient.     Upon re-evaluation remains stable in ED. US showing epididymitis. Given no reported concern for STI will treat with levofloxacin. Will send UA for culture and GC/chlamydia.     Urology referral provided. Recommended PCP and urology follow up. Levofloxacin sent to pharmacy. Discussed need for follow up and return precautions.     Patient left prior to written dc papers. Called to emphasize need to pickup Rx. And urology and pcp follow up as well as strict return precautions. Patient verbalized understanding of return precautions.       ED Course as of 06/28/25 1522   Thu Jun 26, 2025   1729 Leukocytes, UA: Negative   1729 Nitrite, UA: Negative   1729 Blood, UA: Negative   1729  US scrotum and testicles  IMPRESSION:     Hyperemia of the left epididymis, which can be seen with epididymitis.     Normal testes.         Medications   ketorolac (TORADOL) injection 15 mg (15 mg Intramuscular Given 6/26/25 1705)   levofloxacin (LEVAQUIN) tablet 500 mg (500 mg Oral Given 6/26/25 1818)       ED Risk Strat Scores                    No data recorded                            History of Present Illness       Chief Complaint   Patient presents with    Testicle Pain     Pt reports pain and swelling in L testicle starting yesterday. Pt denies pain with urination.       Past Medical History[1]   Past Surgical History[2]   Family History[3]   Social History[4]   E-Cigarette/Vaping    E-Cigarette Use Former User     Comments Infrequent Hookah use       E-Cigarette/Vaping Substances    Nicotine No     THC No     CBD No     Flavoring No     Other No     Unknown No       I have reviewed and agree with the history as documented.     HPI  Patient is 44 y.o. male with no PMH presenting to ED for evaluation left testicle pain and swelling. He reports noticed mild left testicle pain yesterday afternoon which has progressively worsened. This morning noticed [facility]. Sexually active with one female partner, his wife. Denies fever, chill. Denies abdominal pain, n/v. Denies flank pain. Denies dysuria, hematuria. Denies penile pain, discharge.     Review of Systems   Constitutional:  Negative for chills and fever.   HENT:  Negative for ear pain and sore throat.    Respiratory:  Negative for cough and shortness of breath.    Cardiovascular:  Negative for chest pain, palpitations and leg swelling.   Gastrointestinal:  Negative for abdominal pain, diarrhea, nausea and vomiting.   Genitourinary:  Positive for testicular pain. Negative for dysuria, frequency, hematuria, penile discharge and penile pain.   Musculoskeletal:  Negative for back pain and neck pain.   Skin:  Negative for  rash.   Neurological:  Negative for dizziness, light-headedness and headaches.           Objective       ED Triage Vitals [06/26/25 1507]   Temperature Pulse Blood Pressure Respirations SpO2 Patient Position - Orthostatic VS   98.8 °F (37.1 °C) 84 128/64 18 99 % Sitting      Temp Source Heart Rate Source BP Location FiO2 (%) Pain Score    Temporal Monitor Left arm -- 7      Vitals      Date and Time Temp Pulse SpO2 Resp BP Pain Score FACES Pain Rating User   06/26/25 1705 -- -- -- -- -- 7 -- JK   06/26/25 1507 98.8 °F (37.1 °C) 84 99 % 18 128/64 7 -- BL            Physical Exam  Vitals reviewed.   Constitutional:       General: He is awake.   HENT:      Head: Normocephalic and atraumatic.      Mouth/Throat:      Mouth: Mucous membranes are moist.     Eyes:      Extraocular Movements: Extraocular movements intact.      Right eye: No nystagmus.      Left eye: No nystagmus.      Conjunctiva/sclera: Conjunctivae normal.      Pupils: Pupils are equal, round, and reactive to light.       Cardiovascular:      Rate and Rhythm: Normal rate and regular rhythm.      Pulses: Normal pulses.      Heart sounds: Normal heart sounds, S1 normal and S2 normal. Heart sounds not distant. No murmur heard.     No friction rub. No gallop.   Pulmonary:      Breath sounds: No stridor. No wheezing, rhonchi or rales.      Comments: CTA b/l   Abdominal:      General: Bowel sounds are normal.      Palpations: Abdomen is soft.      Tenderness: There is no abdominal tenderness.   Genitourinary:     Penis: Normal.       Comments: Left testicle edematous, tender with palpable cord. Right testicle normal, no tenderness. B/l cremasteric reflex    Musculoskeletal:      Right lower leg: No edema.      Left lower leg: No edema.     Skin:     General: Skin is warm and dry.      Capillary Refill: Capillary refill takes less than 2 seconds.     Neurological:      Mental Status: He is alert and oriented to person, place, and time.      GCS: GCS eye subscore  is 4. GCS verbal subscore is 5. GCS motor subscore is 6.      Cranial Nerves: Cranial nerves 2-12 are intact.      Sensory: Sensation is intact.      Motor: No weakness or pronator drift.      Coordination: Coordination normal. Finger-Nose-Finger Test normal.         Results Reviewed       Procedure Component Value Units Date/Time    Urine culture [[id number]] Collected: 06/26/25 1746    Lab Status: Final result Specimen: Urine, Clean Catch Updated: 06/27/25 1627     Urine Culture No Growth <1000 cfu/mL    Chlamydia/GC amplified DNA by PCR [[id number]]  (Normal) Collected: 06/26/25 1746    Lab Status: Final result Specimen: Urine, Other Updated: 06/27/25 1326     N gonorrhoeae, DNA Probe Negative     Chlamydia trachomatis, DNA Probe Negative    Narrative:      This test was performed using the FDA-approved [place] 6800 CT/NG assay (Roche [employer]). This test uses real-time PCR to detect Chlamydia trachomatis (CT) and Neisseria gonorrhoeae (NG). This instrument and assay have been validated by the  and performing laboratory and verified by the performing laboratory.  This test is intended as an aid in the diagnosis of chlamydial and gonococcal disease. This test has not been evaluated in patients younger than 14 years of age and is not recommended for evaluation of suspected sexual abuse. This assay is not intended to replace other exams or tests for diagnosis of urogenital infection by causative factors other than Chalmydia trachomatis (CT) and Neisseria gonorrhoeae (NG). Additional testing is recommended when the results do not correlate with clinical signs and symptoms.   Procedural Limitations  This assay has only been validated for use with male and female urine, clinician-instructed self-collected vaginal swab specimens, clinician-collected vaginal swab specimens, endocervical swab specimens collected in thai® PCR Media and cervical specimens collected in PreservCyt® Solution. Assay performance has  not been validated for use with other collection media and/or specimen types.   Detection of C. trachomatis and N. gonorrhoeaea is dependent on the number of organisms present in the specimen. Detection may be affected by specimen collection methods, patient factors, stage of infection, infecting strains, and presence of polymerase/PCR inhibitors.   When CT is present at very high concentrations, the detection of NG present at concentrations near the limit of detection may be impacted.  The presence of mucus in endocervical specimens may lead to false negative results.  The presence of whole blood in urine and cervical specimens collected in PreservCyt Solution may lead to false negative and/or invalid test results.   Urine testing is recommended to be performed on first catch urine samples. The effects of other collection variables have not been evaluated at this time. The effects of vaginal discharge, tampon use, douching, and other collection variables have not been evaluated at this time.   This assay has not been evaluated with patients currently being treated with antimicrobial agents active against CT or NG, or patients with a history of hysterectomy.     UA w Reflex to Microscopic w Reflex to Culture [[id number]]  (Abnormal) Collected: 06/26/25 1708    Lab Status: Final result Specimen: Urine, Clean Catch Updated: 06/26/25 1728     Color, UA Light Yellow     Clarity, UA Clear     Specific Gravity, UA 1.031     pH, UA 6.0     Leukocytes, UA Negative     Nitrite, UA Negative     Protein, UA Negative mg/dl      Glucose, UA Negative mg/dl      Ketones, UA Negative mg/dl      Urobilinogen, UA <2.0 mg/dl      Bilirubin, UA Negative     Occult Blood, UA Negative            US scrotum and testicles   Final Interpretation by Vaughn Hdez MD (06/26 1706)      Hyperemia of the left epididymis, which can be seen with epididymitis.      Normal testes.      Workstation performed: [id number]              Procedures    ED Medication and Procedure Management   Prior to Admission Medications   Prescriptions Last Dose Informant Patient Reported? Taking?   ibuprofen (MOTRIN) 400 mg tablet   Yes No   Sig: Take by mouth every 6 (six) hours as needed for mild pain   naproxen (NAPROSYN) 500 mg tablet   No No   Sig: take 1 tablet by mouth twice a day with meals for 5 days   Patient not taking: Reported on 4/11/2025   predniSONE 10 mg tablet   No No   Sig: Take 6 tabs days 1&2, 5 tabs days 3&4, 4 tabs days 5&6, 3 tabs days 7&8, 2 tabs days 9&10, 1 tab days 11&12. Take with food and spread pills out with meals. For example on days 1 and 2 take 2 tabs with breakfast, 2 tabs with lunch, and 2 tabs with dinner.      Facility-Administered Medications: None     Discharge Medication List as of 6/26/2025  6:24 PM        CONTINUE these medications which have CHANGED    Details   levofloxacin (LEVAQUIN) 500 mg tablet Take 1 tablet (500 mg total) by mouth every 24 hours for 9 days First dose in ED Do not start before June 27, 2025., Starting Fri 6/27/2025, Until Sun 7/6/2025, Normal           CONTINUE these medications which have NOT CHANGED    Details   ibuprofen (MOTRIN) 400 mg tablet Take by mouth every 6 (six) hours as needed for mild pain, Historical Med      naproxen (NAPROSYN) 500 mg tablet take 1 tablet by mouth twice a day with meals for 5 days, Normal      predniSONE 10 mg tablet Take 6 tabs days 1&2, 5 tabs days 3&4, 4 tabs days 5&6, 3 tabs days 7&8, 2 tabs days 9&10, 1 tab days 11&12. Take with food and spread pills out with meals. For example on days 1 and 2 take 2 tabs with breakfast, 2 tabs with lunch, and 2 tabs with dinner ., Normal             ED SEPSIS DOCUMENTATION   Time reflects when diagnosis was documented in both MDM as applicable and the Disposition within this note       Time User Action Codes Description Comment    6/26/2025  5:38 PM Millie Frank Add [N45.1] Epididymitis                    [1] No past  medical history on file.  [2]   Past Surgical History:  Procedure Laterality Date    MO NEUROPLASTY &/TRANSPOS MEDIAN NRV CARPAL TUNNE Bilateral 12/13/2024    Procedure: RELEASE CARPAL TUNNEL, BILATERAL;  Surgeon: Jeffrey Costello MD;  Location: [facility];  Service: Orthopedics    VASECTOMY  05/19/2023    Dr. Filippo Meeks   [3]   Family History  Problem Relation Name Age of Onset    Diabetes Mother     [4]   Social History  Tobacco Use    Smoking status: Never    Smokeless tobacco: Never   Vaping Use    Vaping status: Former   Substance Use Topics    Alcohol use: Yes     Comment: Weekend/social    Drug use: Never        Millie Frank,   06/28/25 1522

## 2025-06-26 NOTE — PATIENT INSTRUCTIONS
Report directly to [facility] emergency department at [address] [place] [address] in [place], [place].

## 2025-06-26 NOTE — ED ATTENDING ATTESTATION
6/26/2025  I, Florencio Alvarez MD, saw and evaluated the patient. I have discussed the patient with the resident/non-physician practitioner and agree with the resident's/non-physician practitioner's findings, Plan of Care, and MDM as documented in the resident's/non-physician practitioner's note, except where noted. All available labs and Radiology studies were reviewed.  I was present for key portions of any procedure(s) performed by the resident/non-physician practitioner and I was immediately available to provide assistance.       At this point I agree with the current assessment done in the Emergency Department.  I have conducted an independent evaluation of this patient a history and physical is as follows:    ED Course     Patient presents for evaluation due to 1 day of left testicular pain and swelling.  He states that he was playing with his son when he noticed the discomfort.  He denies any explicit trauma or prior episode of same.  He denies any difficulty urinating or penile discharge.  No concerns for STDs.  No additional complaints.  Exam: AAOx3, NAD, left testicular swelling with tenderness over inferior portion, positive cremasteric reflex bilaterally. A/P: Left testicular pain.  Will check urine to evaluate for infection and will check ultrasound to rule out torsion and to also evaluate for possible epididymitis.     Critical Care Time  Procedures

## 2025-06-26 NOTE — PROGRESS NOTES
"Name: Garcia Rivera      : 1980      MRN: [id number]  Encounter Provider: Jaleesa Vasquez PA-C  Encounter Date: 2025   Encounter department: [facility]  :  Assessment & Plan  Pain in left testicle    Orders:    Transfer to other facility    Testicular swelling, left    Orders:    Transfer to other facility    Pt with left testicular swelling and tenderness. No discoloration of the scrotum. Concern for hydrocele or spermatocele. US needed for dx.     History of Present Illness   Testicle Pain  He complains of scrotal swelling and testicular pain.     Garcia Rivera is a 44 y.o. male who presents with complaint of left testicular pain and swelling that began yesterday. Pt reports 8/10 pain in the left testicle currently. He notes it has worsened today compared to yesterday. Pt denies changes in sexual partners and concerns for STI.   History obtained from: patient    Review of Systems   Genitourinary:  Positive for scrotal swelling and testicular pain.     Medical History Reviewed by provider this encounter:  Meds  Problems     .     Objective   /74   Pulse 84   Temp 97.7 °F (36.5 °C)   Resp 18   Ht 5' 11\" (1.803 m)   Wt 81.6 kg (180 lb)   SpO2 99%   BMI 25.10 kg/m²      Physical Exam  Vitals and nursing note reviewed. Exam conducted with a chaperone present (Dayana Hurst RN).   Constitutional:       General: He is awake. He is not in acute distress.  HENT:      Head: Normocephalic and atraumatic.      Right Ear: Hearing and external ear normal.      Left Ear: Hearing and external ear normal.      Nose: No nasal deformity.      Mouth/Throat:      Lips: Pink. No lesions.   Genitourinary:     Testes:         Left: Tenderness and swelling present. Mass, testicular hydrocele or varicocele not present. Left testis is descended. Cremasteric reflex is present.       Epididymis:      Left: Normal.      Comments: Negative Phren sign     Skin:     Coloration: Skin " is not pale.     Neurological:      Mental Status: He is alert and easily aroused.     Psychiatric:         Attention and Perception: Attention and perception normal.         Mood and Affect: Mood and affect normal.         Behavior: Behavior normal. Behavior is cooperative.

## 2025-06-27 ENCOUNTER — TELEPHONE (OUTPATIENT)
Dept: ADMINISTRATIVE | Facility: OTHER | Age: 45
End: 2025-06-27

## 2025-06-27 LAB
BACTERIA UR CULT: NORMAL
C TRACH DNA SPEC QL NAA+PROBE: NEGATIVE
N GONORRHOEA DNA SPEC QL NAA+PROBE: NEGATIVE

## 2025-06-27 NOTE — TELEPHONE ENCOUNTER
06/27/25 11:12 AM    Patient contacted post ED visit, [facility] department spoke with patient/caregiver and outreach was successful.    Thank you.  Millicent Landa MA  [facility]

## 2025-07-03 ENCOUNTER — OFFICE VISIT (OUTPATIENT)
Dept: PHYSICAL THERAPY | Facility: REHABILITATION | Age: 45
End: 2025-07-03
Attending: PHYSICIAN ASSISTANT
Payer: COMMERCIAL

## 2025-07-03 DIAGNOSIS — S39.012A LUMBAR STRAIN, INITIAL ENCOUNTER: Primary | ICD-10-CM

## 2025-07-03 PROCEDURE — 97110 THERAPEUTIC EXERCISES: CPT

## 2025-07-03 PROCEDURE — 97112 NEUROMUSCULAR REEDUCATION: CPT

## 2025-07-03 NOTE — PROGRESS NOTES
"Daily Note     Today's date: 7/3/2025  Patient name: Garcia Rivera  : 1980  MRN: [id number]  Referring provider: Dirk Doss P*  Dx:   Encounter Diagnosis     ICD-10-CM    1. Lumbar strain, initial encounter  S39.012A           Start Time: 0815  Stop Time: 0855  Total time in clinic (min): 40 minutes    Subjective: Patient states he still has left sided low back pain that worsens when he does standing lumbar extensions, but improves with PPU.       Objective: See treatment diary below      Assessment: Good tolerance to session. Patient able to perform exercises without complaints of pain. Pt will benefit from continued skilled PT intervention in order to address remaining limitations and to restore maximal function.   Pt was educated that due to completing new exercises on this date, he may feel some soreness later like he did a workout but it should not be an increase in pain. Pt was educated to inform therapist at next appointment how she felt so that plan of care can be adjusted as needed, pt verbalized understanding.         Plan: Continue per plan of care.      Precautions: None      Manuals 6/24 7/3                                                               Neuro Re-Ed             Sup TA activation  5\"x10           Sup TA with marches  x10           Sup TA with alt UE/LE             Bridges  5\" x10           Planks  15\"x5           Side planks                          Ther Ex             Treadmill  6'           L/S ext in stand HEP            Prone press ups HEP            [place] rows  5\"x10 15#           [place] lat pull downs  10# 5\" x10           [place] Multifidus press  5# 5\" x10 each                                     Ther Activity                                       Gait Training                                       Modalities                                            "

## 2025-08-11 ENCOUNTER — OFFICE VISIT (OUTPATIENT)
Age: 45
End: 2025-08-11
Payer: COMMERCIAL

## (undated) DEVICE — SUT ETHILON 4-0 PS-2 18 IN 1667H

## (undated) DEVICE — OCCLUSIVE GAUZE STRIP,3% BISMUTH TRIBROMOPHENATE IN PETROLATUM BLEND: Brand: XEROFORM

## (undated) DEVICE — PREP PAD BNS: Brand: CONVERTORS

## (undated) DEVICE — GLOVE INDICATOR PI UNDERGLOVE SZ 7.5 BLUE

## (undated) DEVICE — GLOVE INDICATOR PI UNDERGLOVE SZ 8 BLUE

## (undated) DEVICE — GAUZE SPONGES,16 PLY: Brand: CURITY

## (undated) DEVICE — STERILE BETHLEHEM PLASTIC HAND: Brand: CARDINAL HEALTH

## (undated) DEVICE — INTENDED FOR TISSUE SEPARATION, AND OTHER PROCEDURES THAT REQUIRE A SHARP SURGICAL BLADE TO PUNCTURE OR CUT.: Brand: BARD-PARKER ® CARBON RIB-BACK BLADES

## (undated) DEVICE — GLOVE SRG BIOGEL 7.5

## (undated) DEVICE — ACE WRAP 3 IN UNSTERILE